# Patient Record
Sex: FEMALE | Race: BLACK OR AFRICAN AMERICAN | NOT HISPANIC OR LATINO | Employment: OTHER | URBAN - NONMETROPOLITAN AREA
[De-identification: names, ages, dates, MRNs, and addresses within clinical notes are randomized per-mention and may not be internally consistent; named-entity substitution may affect disease eponyms.]

---

## 2017-10-30 ENCOUNTER — TRANSCRIBE ORDERS (OUTPATIENT)
Dept: ADMINISTRATIVE | Facility: HOSPITAL | Age: 67
End: 2017-10-30

## 2017-10-30 DIAGNOSIS — Z12.39 SCREENING BREAST EXAMINATION: Primary | ICD-10-CM

## 2017-11-28 ENCOUNTER — HOSPITAL ENCOUNTER (OUTPATIENT)
Dept: MAMMOGRAPHY | Facility: HOSPITAL | Age: 67
Discharge: HOME | End: 2017-11-28
Admitting: FAMILY MEDICINE

## 2017-11-28 DIAGNOSIS — Z12.39 SCREENING BREAST EXAMINATION: ICD-10-CM

## 2017-11-28 PROCEDURE — G0202 SCR MAMMO BI INCL CAD: HCPCS

## 2017-11-28 PROCEDURE — 77063 BREAST TOMOSYNTHESIS BI: CPT

## 2018-10-22 ENCOUNTER — TRANSCRIBE ORDERS (OUTPATIENT)
Dept: MAMMOGRAPHY | Facility: HOSPITAL | Age: 68
End: 2018-10-22

## 2018-10-22 DIAGNOSIS — Z12.39 BREAST CANCER SCREENING: Primary | ICD-10-CM

## 2018-12-06 ENCOUNTER — HOSPITAL ENCOUNTER (OUTPATIENT)
Dept: MAMMOGRAPHY | Facility: HOSPITAL | Age: 68
Discharge: HOME | End: 2018-12-06
Admitting: FAMILY MEDICINE

## 2018-12-06 DIAGNOSIS — Z12.39 BREAST CANCER SCREENING: ICD-10-CM

## 2018-12-06 PROCEDURE — 77067 SCR MAMMO BI INCL CAD: CPT

## 2018-12-06 PROCEDURE — 77063 BREAST TOMOSYNTHESIS BI: CPT

## 2018-12-10 ENCOUNTER — TRANSCRIBE ORDERS (OUTPATIENT)
Dept: ADMINISTRATIVE | Facility: HOSPITAL | Age: 68
End: 2018-12-10

## 2018-12-10 DIAGNOSIS — I70.219 ATHEROSCLEROSIS WITH LIMB CLAUDICATION (HCC): Primary | ICD-10-CM

## 2019-01-03 ENCOUNTER — HOSPITAL ENCOUNTER (OUTPATIENT)
Facility: HOSPITAL | Age: 69
Setting detail: HOSPITAL OUTPATIENT SURGERY
Discharge: HOME | End: 2019-01-03
Attending: INTERNAL MEDICINE | Admitting: INTERNAL MEDICINE

## 2019-01-03 VITALS
HEART RATE: 60 BPM | BODY MASS INDEX: 29.37 KG/M2 | DIASTOLIC BLOOD PRESSURE: 75 MMHG | RESPIRATION RATE: 16 BRPM | OXYGEN SATURATION: 96 % | WEIGHT: 159.61 LBS | SYSTOLIC BLOOD PRESSURE: 153 MMHG | TEMPERATURE: 97.4 F | HEIGHT: 62 IN

## 2019-01-03 DIAGNOSIS — I70.219 ATHEROSCLEROSIS WITH LIMB CLAUDICATION (HCC): ICD-10-CM

## 2019-01-03 LAB
ANION GAP SERPL CALCULATED.3IONS-SCNC: 7 MMOL/L (ref 3–11)
BUN BLD-MCNC: 12 MG/DL (ref 9–23)
BUN/CREAT SERPL: 14.6 (ref 7–25)
CALCIUM SPEC-SCNC: 10.1 MG/DL (ref 8.7–10.4)
CHLORIDE SERPL-SCNC: 103 MMOL/L (ref 99–109)
CO2 SERPL-SCNC: 29 MMOL/L (ref 20–31)
CREAT BLD-MCNC: 0.82 MG/DL (ref 0.6–1.3)
DEPRECATED RDW RBC AUTO: 50.3 FL (ref 37–54)
ERYTHROCYTE [DISTWIDTH] IN BLOOD BY AUTOMATED COUNT: 16.5 % (ref 11.3–14.5)
GFR SERPL CREATININE-BSD FRML MDRD: 85 ML/MIN/1.73
GLUCOSE BLD-MCNC: 107 MG/DL (ref 70–100)
HCT VFR BLD AUTO: 37.5 % (ref 34.5–44)
HGB BLD-MCNC: 12.1 G/DL (ref 11.5–15.5)
MCH RBC QN AUTO: 27.2 PG (ref 27–31)
MCHC RBC AUTO-ENTMCNC: 32.3 G/DL (ref 32–36)
MCV RBC AUTO: 84.3 FL (ref 80–99)
PLATELET # BLD AUTO: 338 10*3/MM3 (ref 150–450)
PMV BLD AUTO: 9.9 FL (ref 6–12)
POTASSIUM BLD-SCNC: 4 MMOL/L (ref 3.5–5.5)
RBC # BLD AUTO: 4.45 10*6/MM3 (ref 3.89–5.14)
SODIUM BLD-SCNC: 139 MMOL/L (ref 132–146)
WBC NRBC COR # BLD: 6.25 10*3/MM3 (ref 3.5–10.8)

## 2019-01-03 PROCEDURE — C1769 GUIDE WIRE: HCPCS | Performed by: INTERNAL MEDICINE

## 2019-01-03 PROCEDURE — 36415 COLL VENOUS BLD VENIPUNCTURE: CPT

## 2019-01-03 PROCEDURE — 25010000002 MIDAZOLAM PER 1 MG: Performed by: INTERNAL MEDICINE

## 2019-01-03 PROCEDURE — 0 IOPAMIDOL PER 1 ML: Performed by: INTERNAL MEDICINE

## 2019-01-03 PROCEDURE — 80048 BASIC METABOLIC PNL TOTAL CA: CPT | Performed by: INTERNAL MEDICINE

## 2019-01-03 PROCEDURE — 85027 COMPLETE CBC AUTOMATED: CPT | Performed by: INTERNAL MEDICINE

## 2019-01-03 PROCEDURE — 75774 ARTERY X-RAY EACH VESSEL: CPT | Performed by: INTERNAL MEDICINE

## 2019-01-03 PROCEDURE — C1894 INTRO/SHEATH, NON-LASER: HCPCS | Performed by: INTERNAL MEDICINE

## 2019-01-03 PROCEDURE — 25010000002 FENTANYL CITRATE (PF) 100 MCG/2ML SOLUTION: Performed by: INTERNAL MEDICINE

## 2019-01-03 PROCEDURE — 75625 CONTRAST EXAM ABDOMINL AORTA: CPT | Performed by: INTERNAL MEDICINE

## 2019-01-03 PROCEDURE — 75716 ARTERY X-RAYS ARMS/LEGS: CPT | Performed by: INTERNAL MEDICINE

## 2019-01-03 RX ORDER — ALPRAZOLAM 0.25 MG/1
0.25 TABLET ORAL 3 TIMES DAILY PRN
Status: DISCONTINUED | OUTPATIENT
Start: 2019-01-03 | End: 2019-01-03 | Stop reason: HOSPADM

## 2019-01-03 RX ORDER — ATORVASTATIN CALCIUM 40 MG/1
40 TABLET, FILM COATED ORAL NIGHTLY
COMMUNITY

## 2019-01-03 RX ORDER — ASPIRIN 325 MG
325 TABLET, DELAYED RELEASE (ENTERIC COATED) ORAL DAILY
Status: DISCONTINUED | OUTPATIENT
Start: 2019-01-03 | End: 2019-01-03 | Stop reason: HOSPADM

## 2019-01-03 RX ORDER — MIDAZOLAM HYDROCHLORIDE 1 MG/ML
INJECTION INTRAMUSCULAR; INTRAVENOUS AS NEEDED
Status: DISCONTINUED | OUTPATIENT
Start: 2019-01-03 | End: 2019-01-03 | Stop reason: HOSPADM

## 2019-01-03 RX ORDER — CLOPIDOGREL BISULFATE 75 MG/1
75 TABLET ORAL DAILY
COMMUNITY
End: 2020-01-07 | Stop reason: HOSPADM

## 2019-01-03 RX ORDER — TEMAZEPAM 7.5 MG/1
7.5 CAPSULE ORAL NIGHTLY PRN
Status: DISCONTINUED | OUTPATIENT
Start: 2019-01-03 | End: 2019-01-03 | Stop reason: HOSPADM

## 2019-01-03 RX ORDER — IRBESARTAN AND HYDROCHLOROTHIAZIDE 300; 12.5 MG/1; MG/1
1 TABLET, FILM COATED ORAL DAILY
COMMUNITY

## 2019-01-03 RX ORDER — ACETAMINOPHEN 325 MG/1
650 TABLET ORAL EVERY 4 HOURS PRN
Status: DISCONTINUED | OUTPATIENT
Start: 2019-01-03 | End: 2019-01-03 | Stop reason: HOSPADM

## 2019-01-03 RX ORDER — FENTANYL CITRATE 50 UG/ML
INJECTION, SOLUTION INTRAMUSCULAR; INTRAVENOUS AS NEEDED
Status: DISCONTINUED | OUTPATIENT
Start: 2019-01-03 | End: 2019-01-03 | Stop reason: HOSPADM

## 2019-01-03 RX ORDER — HYDROCODONE BITARTRATE AND ACETAMINOPHEN 5; 325 MG/1; MG/1
1 TABLET ORAL EVERY 4 HOURS PRN
Status: DISCONTINUED | OUTPATIENT
Start: 2019-01-03 | End: 2019-01-03 | Stop reason: HOSPADM

## 2019-01-03 RX ORDER — ASPIRIN 81 MG/1
81 TABLET ORAL DAILY
COMMUNITY

## 2019-01-03 RX ORDER — DILTIAZEM HYDROCHLORIDE 300 MG/1
300 CAPSULE, COATED, EXTENDED RELEASE ORAL DAILY
COMMUNITY
End: 2019-11-27 | Stop reason: ALTCHOICE

## 2019-01-03 RX ORDER — SODIUM CHLORIDE 9 MG/ML
250 INJECTION, SOLUTION INTRAVENOUS CONTINUOUS
Status: ACTIVE | OUTPATIENT
Start: 2019-01-03 | End: 2019-01-03

## 2019-01-03 RX ORDER — LIDOCAINE HYDROCHLORIDE 10 MG/ML
INJECTION, SOLUTION EPIDURAL; INFILTRATION; INTRACAUDAL; PERINEURAL AS NEEDED
Status: DISCONTINUED | OUTPATIENT
Start: 2019-01-03 | End: 2019-01-03 | Stop reason: HOSPADM

## 2019-01-03 RX ORDER — OMEGA-3S/DHA/EPA/FISH OIL/D3 300MG-1000
400 CAPSULE ORAL 2 TIMES DAILY
COMMUNITY

## 2019-01-03 RX ORDER — OXYCODONE AND ACETAMINOPHEN 10; 325 MG/1; MG/1
1 TABLET ORAL EVERY 4 HOURS PRN
Status: DISCONTINUED | OUTPATIENT
Start: 2019-01-03 | End: 2019-01-03 | Stop reason: HOSPADM

## 2019-01-03 RX ORDER — TRAZODONE HYDROCHLORIDE 50 MG/1
50 TABLET ORAL NIGHTLY PRN
COMMUNITY

## 2019-01-03 RX ORDER — SODIUM CHLORIDE 9 MG/ML
250 INJECTION, SOLUTION INTRAVENOUS ONCE AS NEEDED
Status: DISCONTINUED | OUTPATIENT
Start: 2019-01-03 | End: 2019-01-03 | Stop reason: HOSPADM

## 2019-01-03 RX ADMIN — ASPIRIN 325 MG: 325 TABLET, DELAYED RELEASE ORAL at 12:09

## 2019-01-03 NOTE — H&P
Pre-Catheterization Report  Cardiovascular Laboratory  [facility]      Patient:  Annalise Corona  :  1950  PCP:  Lisa Martinez MD  PHONE:  [phone]    DATE: 1/3/2019    BRIEF HPI:  Annalise Corona is a 68 y.o. female with hypertension, hypercholesterolemia, coronary artery disease, and peripheral arterial disease.  She is post previous left femoropopliteal from 2011.  She is complaining of continued left leg pain which increases with walking and is decreased with rest.  She recently underwent an abnormal lower extremity arterial duplex Doppler.  She now presents for AA with runoff.      Cardiac Risk Factors:  advanced age (older than 55 for men, 65 for women), dyslipidemia, family history of premature cardiovascular disease, hypertension    Anginal class in last 2 weeks:  No anginal symptoms    CHF Class in last 2 weeks:  NYHA Class I    Cardiogenic shock:  no    Cardiac arrest <24 hours:  no    Stress test within last 6 months:   no   Details:    Previous cardiac catheterization:  yes  Details:     Previous CABG:  no  Details:      Allergies:     IV contrast allergy:  no  Allergies not on file    MEDICATIONS:  Prior to Admission medications    Medication Sig Start Date End Date Taking? Authorizing Provider   aspirin 81 MG EC tablet Take 81 mg by mouth Daily.   Yes ProviderAldo MD   atorvastatin (LIPITOR) 40 MG tablet Take 40 mg by mouth Daily.   Yes ProviderAldo MD   cholecalciferol (VITAMIN D3) 400 units tablet Take 400 Units by mouth 2 (Two) Times a Day.   Yes Aldo Brandon MD   clopidogrel (PLAVIX) 75 MG tablet Take 75 mg by mouth Daily.   Yes ProviderAldo MD   diltiaZEM CD (CARDIZEM CD) 300 MG 24 hr capsule Take 300 mg by mouth Daily.   Yes Aldo Brandon MD   irbesartan-hydrochlorothiazide (AVALIDE) 300-12.5 MG tablet Take 1 tablet by mouth Daily.   Yes Aldo Brandon MD   Multiple Vitamin (MULTIVITAMINS PO) Take  by mouth  Daily.   Yes Provider, MD Aldo   traZODone (DESYREL) 50 MG tablet Take 50 mg by mouth Every Night.   Yes Provider, MD Aldo       Past medical & surgical history, social and family history reviewed in the electronic medical record.    ROS:  Cardiovascular ROS: no chest pain or dyspnea on exertion    Physical Exam:    Vitals: There were no vitals filed for this visit. There were no vitals filed for this visit.    General Appearance:    Alert, cooperative, in no acute distress   Head:    Normocephalic, without obvious abnormality, atraumatic   Eyes:            Lids and lashes normal, conjunctivae and sclerae normal, no   icterus, no pallor, corneas clear, PERRLA   Ears:    Ears appear intact with no abnormalities noted   Throat:      Neck:   No adenopathy, supple, trachea midline, no thyromegaly, no     carotid bruit, no JVD   Back:     No kyphosis present, no scoliosis present, range of motion normal   Lungs:     Clear to auscultation,respirations regular, even and                   unlabored    Heart:    Regular rhythm and normal rate, normal S1 and S2, no            murmur, no gallop, no rub, no click   Breast Exam:    Deferred   Abdomen:     Normal bowel sounds, no masses, no organomegaly, soft        non-tender, non-distended, no guarding, no rebound                 tenderness   Genitalia:    Deferred   Extremities:   Moves all extremities well, no edema, no cyanosis, no              redness   Pulses:   Pulses not palpable    Skin:   No bleeding, bruising or rash   Lymph nodes:      Neurologic:   Cranial nerves 2 - 12 grossly intact     Barbaeu Test:  Left: Not Assessed  (oxymetric Allens) Right: Not Assessed             No results found for: CHLPL, TRIG, HDL, LDLDIRECT, AST, ALT        IMPRESSION:  · Abnormal lower extremity arterial duplex    PLAN:  · Procedure to perform: AA with runoff  · Planned access:  Per M.D.

## 2019-05-07 RX ORDER — SODIUM, POTASSIUM,MAG SULFATES 17.5-3.13G
SOLUTION, RECONSTITUTED, ORAL ORAL
Qty: 2 BOTTLE | Refills: 0 | Status: SHIPPED | OUTPATIENT
Start: 2019-05-07 | End: 2019-09-16

## 2019-07-29 ENCOUNTER — TRANSCRIBE ORDERS (OUTPATIENT)
Dept: ADMINISTRATIVE | Facility: HOSPITAL | Age: 69
End: 2019-07-29

## 2019-07-29 DIAGNOSIS — Z78.0 POSTMENOPAUSAL STATE: Primary | ICD-10-CM

## 2019-07-30 ENCOUNTER — APPOINTMENT (OUTPATIENT)
Dept: BONE DENSITY | Facility: HOSPITAL | Age: 69
End: 2019-07-30

## 2019-07-30 DIAGNOSIS — Z78.0 POSTMENOPAUSAL STATE: ICD-10-CM

## 2019-07-30 PROCEDURE — 77080 DXA BONE DENSITY AXIAL: CPT

## 2019-09-16 ENCOUNTER — OFFICE VISIT (OUTPATIENT)
Dept: GASTROENTEROLOGY | Facility: CLINIC | Age: 69
End: 2019-09-16

## 2019-09-16 DIAGNOSIS — K63.5 POLYP OF COLON, UNSPECIFIED PART OF COLON, UNSPECIFIED TYPE: ICD-10-CM

## 2019-09-16 DIAGNOSIS — Z12.11 COLON CANCER SCREENING: Primary | ICD-10-CM

## 2019-09-16 DIAGNOSIS — D64.9 ANEMIA, UNSPECIFIED TYPE: ICD-10-CM

## 2019-09-16 PROCEDURE — S0260 H&P FOR SURGERY: HCPCS | Performed by: INTERNAL MEDICINE

## 2019-09-16 RX ORDER — PHENOL 1.4 %
600 AEROSOL, SPRAY (ML) MUCOUS MEMBRANE 2 TIMES DAILY WITH MEALS
COMMUNITY

## 2019-09-16 NOTE — PATIENT INSTRUCTIONS
1. Low-fat-high-fiber diet.  2. Colonoscopy: Description of the procedure, risks benefits alternatives and options including non-operative options were discussed with the patient in detail.  The patient understands and wishes to proceed.  3. Records from cardiology service-Dr. Osman.

## 2019-09-16 NOTE — PROGRESS NOTES
Chief Complaint   Patient presents with   • Colon Cancer Screening     History of Present Illness     For colon cancer screening.  The patient denies recent change in bowel habits.  There is no diarrhea or constipation.  There is no history of abdominal pain.  There is no history of overt GI bleed (hematemesis melena or hematochezia).  The patient denies nausea or vomiting.  There is no history of reflux.  The patient denies dysphagia or odynophagia.  There is no history of recent significant weight loss.  There is no history of liver or pancreatic disease.    The patient had undergone a colonoscopy about 5 years ago in [facility].  There is history of colon polyps.  Unfortunately, the details are not known.  The patient was recommended to have a follow-up colonoscopy in 5 years.  Recently, in May 2019 the patient was noted to be mildly anemic with hemoglobin of 11.6.  There is no history of gross hematuria, nosebleeds or vaginal bleeds.  There is no associated weakness, dizziness, chest pains or excessive shortness of breath.    The patient has history of peripheral vascular disease as well as coronary artery disease for which she had stents placed in the past.  Currently the patient is on aspirin and Plavix.     Review of Systems   Constitutional: Negative for appetite change, chills, fatigue, fever and unexpected weight change.   HENT: Negative for mouth sores, nosebleeds and trouble swallowing.    Eyes: Negative for discharge and redness.   Respiratory: Negative for apnea, cough and shortness of breath.    Cardiovascular: Negative for chest pain, palpitations and leg swelling.   Gastrointestinal: Negative for abdominal distention, abdominal pain, anal bleeding, blood in stool, constipation, diarrhea, nausea and vomiting.   Endocrine: Negative for cold intolerance, heat intolerance and polydipsia.   Genitourinary: Negative for dysuria, hematuria and urgency.   Musculoskeletal: Negative for arthralgias,  joint swelling and myalgias.   Skin: Negative for rash.   Allergic/Immunologic: Negative for food allergies and immunocompromised state.   Neurological: Negative for dizziness, seizures, syncope and headaches.   Hematological: Negative for adenopathy. Does not bruise/bleed easily.   Psychiatric/Behavioral: Positive for sleep disturbance. Negative for dysphoric mood. The patient is not nervous/anxious and is not hyperactive.      Patient Active Problem List   Diagnosis   • Atherosclerosis with limb claudication (CMS/HCC)     Past Medical History:   Diagnosis Date   • Arthritis    • Borderline diabetes    • Hyperlipidemia    • Hypertension    • PVD (peripheral vascular disease) (CMS/HCC)     12 years ago    • Wears glasses      Past Surgical History:   Procedure Laterality Date   • ANGIOPLASTY      stents after  per patient    • BREAST BIOPSY Left        • HYSTERECTOMY     • INTERVENTIONAL RADIOLOGY PROCEDURE N/A 1/3/2019    Procedure: Abdominal Aortagram with Runoff;  Surgeon: Charan Osman MD;  Location: [facility] INVASIVE LOCATION;  Service: Cardiovascular     Family History   Problem Relation Age of Onset   • Colon cancer Father    • Lung cancer Father      Social History     Tobacco Use   • Smoking status: Former Smoker     Last attempt to quit: 1/3/2013     Years since quittin.7   • Smokeless tobacco: Never Used   Substance Use Topics   • Alcohol use: Yes     Comment: occassional       Current Outpatient Medications:   •  aspirin 81 MG EC tablet, Take 81 mg by mouth Daily., Disp: , Rfl:   •  atorvastatin (LIPITOR) 40 MG tablet, Take 40 mg by mouth Daily., Disp: , Rfl:   •  calcium carbonate (CALCIUM 600) 600 MG tablet, Take 600 mg by mouth Daily., Disp: , Rfl:   •  cholecalciferol (VITAMIN D3) 400 units tablet, Take 400 Units by mouth 2 (Two) Times a Day., Disp: , Rfl:   •  clopidogrel (PLAVIX) 75 MG tablet, Take 75 mg by mouth Daily., Disp: , Rfl:   •  diltiaZEM CD (CARDIZEM CD) 300 MG 24  hr capsule, Take 300 mg by mouth Daily., Disp: , Rfl:   •  irbesartan-hydrochlorothiazide (AVALIDE) 300-12.5 MG tablet, Take 1 tablet by mouth Daily., Disp: , Rfl:   •  Multiple Vitamin (MULTIVITAMINS PO), Take  by mouth Daily., Disp: , Rfl:   •  Omega-3 Fatty Acids (OMEGA 3 500 PO), Take  by mouth. 520 mg daily, Disp: , Rfl:   •  traZODone (DESYREL) 50 MG tablet, Take 50 mg by mouth Every Night., Disp: , Rfl:     No Known Allergies    There were no vitals taken for this visit.    Physical Exam   Constitutional: She is oriented to person, place, and time. She appears well-developed and well-nourished. No distress.   HENT:   Head: Normocephalic and atraumatic.   Right Ear: Hearing and external ear normal.   Left Ear: Hearing and external ear normal.   Nose: Nose normal.   Mouth/Throat: Oropharynx is clear and moist and mucous membranes are normal. Mucous membranes are not pale, not dry and not cyanotic. No oral lesions. No oropharyngeal exudate.   Eyes: Conjunctivae and EOM are normal. Right eye exhibits no discharge. Left eye exhibits no discharge. No scleral icterus.   Neck: Trachea normal. Neck supple. No JVD present. No edema present. No thyroid mass and no thyromegaly present.   Cardiovascular: Normal rate, regular rhythm, S2 normal and normal heart sounds. Exam reveals no gallop, no S3 and no friction rub.   No murmur heard.  Pulmonary/Chest: Effort normal and breath sounds normal. No respiratory distress. She has no wheezes. She has no rales. She exhibits no tenderness.   Abdominal: Soft. Normal appearance and bowel sounds are normal. She exhibits no distension, no ascites and no mass. There is no splenomegaly or hepatomegaly. There is no tenderness. There is no rigidity, no rebound and no guarding. No hernia.   Musculoskeletal: She exhibits no tenderness or deformity.     Vascular Status -  Her right foot exhibits no edema. Her left foot exhibits no edema.  Lymphadenopathy:     She has no cervical adenopathy.         Left: No supraclavicular adenopathy present.   Neurological: She is alert and oriented to person, place, and time. She has normal strength. No cranial nerve deficit or sensory deficit. She exhibits normal muscle tone. Coordination normal.   Skin: No rash noted. She is not diaphoretic. No cyanosis. No pallor. Nails show no clubbing.   Psychiatric: She has a normal mood and affect. Her behavior is normal. Judgment and thought content normal.   Nursing note and vitals reviewed.  Stigmata of chronic liver disease:  None.  Asterixis:  None.    Laboratory Testing:  Upon review of medical records:    Dated January 3, 2019 sodium 139 potassium 4.0 chloride 103 CO2 29 BUN 12 serum creatinine 0.82 glucose 107.  Calcium 10.1.  WBC 6.25 hemoglobin 12.1 hematocrit 37.5 MCV 84.3 and platelet count 338.    Dated April 16, 2019 sodium 144 potassium 4.2 chloride 100 CO2 25 BUN 21 serum creatinine 0.94 glucose 88.  Calcium 10.3.  Total protein 7.1.  Albumin 4.8.  T bili 0.2 AST 20 ALT 13 alkaline phosphatase 60.  Total cholesterol 215.  Triglycerides 48.  WBC 5.2 hemoglobin 11.8 hemoglobin 30.6 0.1 MCV 85 platelet count 346.    Procedures:   Upon review of records:     Dated April 21, 2014 the patient underwent an upper endoscopy which revealed: Report unavailable.  Gastric biopsies revealed reactive gastropathy with focal surface erosion; special stain negative for Helicobacter.  Duodenal biopsies revealed no significant pathologic abnormalities.    Assessment:      ICD-10-CM ICD-9-CM   1. Colon cancer screening Z12.11 V76.51   2. Anemia, unspecified type D64.9 285.9   3. Polyp of colon, unspecified part of colon, unspecified type K63.5 211.3         Discussion:  1.     Plan/  Patient Instructions   1. Low-fat-high-fiber diet.  2. Colonoscopy: Description of the procedure, risks benefits alternatives and options including non-operative options were discussed with the patient in detail.  The patient understands and wishes to  proceed.  3. Records from cardiology service-Dr. Osman.       Kirit Gilmore MD

## 2019-10-02 ENCOUNTER — PREP FOR SURGERY (OUTPATIENT)
Dept: OTHER | Facility: HOSPITAL | Age: 69
End: 2019-10-02

## 2019-10-02 DIAGNOSIS — Z12.11 COLON CANCER SCREENING: Primary | ICD-10-CM

## 2019-10-02 DIAGNOSIS — D64.9 ANEMIA: ICD-10-CM

## 2019-10-02 DIAGNOSIS — Z86.010 HISTORY OF COLON POLYPS: ICD-10-CM

## 2019-10-02 RX ORDER — SODIUM CHLORIDE 9 MG/ML
70 INJECTION, SOLUTION INTRAVENOUS CONTINUOUS PRN
Status: CANCELLED | OUTPATIENT
Start: 2019-10-16

## 2019-10-04 PROBLEM — Z86.0100 HISTORY OF COLON POLYPS: Status: ACTIVE | Noted: 2019-10-04

## 2019-10-04 PROBLEM — D64.9 ANEMIA: Status: ACTIVE | Noted: 2019-10-04

## 2019-10-04 PROBLEM — Z86.010 HISTORY OF COLON POLYPS: Status: ACTIVE | Noted: 2019-10-04

## 2019-10-04 PROBLEM — Z12.11 COLON CANCER SCREENING: Status: ACTIVE | Noted: 2019-10-04

## 2019-10-16 ENCOUNTER — ANESTHESIA (OUTPATIENT)
Dept: GASTROENTEROLOGY | Facility: HOSPITAL | Age: 69
End: 2019-10-16

## 2019-10-16 ENCOUNTER — ANESTHESIA EVENT (OUTPATIENT)
Dept: GASTROENTEROLOGY | Facility: HOSPITAL | Age: 69
End: 2019-10-16

## 2019-10-16 ENCOUNTER — HOSPITAL ENCOUNTER (OUTPATIENT)
Facility: HOSPITAL | Age: 69
Setting detail: HOSPITAL OUTPATIENT SURGERY
Discharge: HOME | End: 2019-10-16
Attending: INTERNAL MEDICINE | Admitting: INTERNAL MEDICINE

## 2019-10-16 VITALS
RESPIRATION RATE: 16 BRPM | BODY MASS INDEX: 28.89 KG/M2 | TEMPERATURE: 98.3 F | SYSTOLIC BLOOD PRESSURE: 104 MMHG | HEART RATE: 52 BPM | DIASTOLIC BLOOD PRESSURE: 56 MMHG | WEIGHT: 157 LBS | OXYGEN SATURATION: 96 % | HEIGHT: 62 IN

## 2019-10-16 DIAGNOSIS — Z12.11 COLON CANCER SCREENING: ICD-10-CM

## 2019-10-16 DIAGNOSIS — Z86.010 HISTORY OF COLON POLYPS: ICD-10-CM

## 2019-10-16 DIAGNOSIS — D64.9 ANEMIA: ICD-10-CM

## 2019-10-16 PROCEDURE — 45380 COLONOSCOPY AND BIOPSY: CPT | Performed by: INTERNAL MEDICINE

## 2019-10-16 PROCEDURE — 45388 COLONOSCOPY W/ABLATION: CPT | Performed by: INTERNAL MEDICINE

## 2019-10-16 PROCEDURE — 25010000002 MIDAZOLAM PER 1 MG: Performed by: NURSE ANESTHETIST, CERTIFIED REGISTERED

## 2019-10-16 PROCEDURE — 25010000002 PROPOFOL 10 MG/ML EMULSION: Performed by: NURSE ANESTHETIST, CERTIFIED REGISTERED

## 2019-10-16 RX ORDER — LIDOCAINE 50 MG/G
OINTMENT TOPICAL AS NEEDED
Status: DISCONTINUED | OUTPATIENT
Start: 2019-10-16 | End: 2019-10-16 | Stop reason: HOSPADM

## 2019-10-16 RX ORDER — PROPOFOL 10 MG/ML
VIAL (ML) INTRAVENOUS AS NEEDED
Status: DISCONTINUED | OUTPATIENT
Start: 2019-10-16 | End: 2019-10-16 | Stop reason: SURG

## 2019-10-16 RX ORDER — SODIUM CHLORIDE 9 MG/ML
70 INJECTION, SOLUTION INTRAVENOUS CONTINUOUS PRN
Status: DISCONTINUED | OUTPATIENT
Start: 2019-10-16 | End: 2019-10-16 | Stop reason: HOSPADM

## 2019-10-16 RX ORDER — MIDAZOLAM HYDROCHLORIDE 5 MG/ML
INJECTION INTRAMUSCULAR; INTRAVENOUS AS NEEDED
Status: DISCONTINUED | OUTPATIENT
Start: 2019-10-16 | End: 2019-10-16 | Stop reason: SURG

## 2019-10-16 RX ORDER — MEPERIDINE HYDROCHLORIDE 50 MG/ML
INJECTION INTRAMUSCULAR; INTRAVENOUS; SUBCUTANEOUS AS NEEDED
Status: DISCONTINUED | OUTPATIENT
Start: 2019-10-16 | End: 2019-10-16 | Stop reason: SURG

## 2019-10-16 RX ORDER — KETAMINE HCL IN NACL, ISO-OSM 100MG/10ML
SYRINGE (ML) INJECTION AS NEEDED
Status: DISCONTINUED | OUTPATIENT
Start: 2019-10-16 | End: 2019-10-16 | Stop reason: SURG

## 2019-10-16 RX ORDER — SIMETHICONE 20 MG/.3ML
EMULSION ORAL AS NEEDED
Status: DISCONTINUED | OUTPATIENT
Start: 2019-10-16 | End: 2019-10-16 | Stop reason: HOSPADM

## 2019-10-16 RX ADMIN — Medication 25 MG: at 09:33

## 2019-10-16 RX ADMIN — PROPOFOL 10 MG: 10 INJECTION, EMULSION INTRAVENOUS at 09:43

## 2019-10-16 RX ADMIN — PROPOFOL 10 MG: 10 INJECTION, EMULSION INTRAVENOUS at 09:58

## 2019-10-16 RX ADMIN — PROPOFOL 10 MG: 10 INJECTION, EMULSION INTRAVENOUS at 09:39

## 2019-10-16 RX ADMIN — PROPOFOL 10 MG: 10 INJECTION, EMULSION INTRAVENOUS at 09:47

## 2019-10-16 RX ADMIN — SODIUM CHLORIDE 70 ML/HR: 9 INJECTION, SOLUTION INTRAVENOUS at 07:41

## 2019-10-16 RX ADMIN — MIDAZOLAM HYDROCHLORIDE 2 MG: 5 INJECTION, SOLUTION INTRAMUSCULAR; INTRAVENOUS at 09:33

## 2019-10-16 RX ADMIN — PROPOFOL 20 MG: 10 INJECTION, EMULSION INTRAVENOUS at 09:33

## 2019-10-16 RX ADMIN — PROPOFOL 10 MG: 10 INJECTION, EMULSION INTRAVENOUS at 09:53

## 2019-10-16 RX ADMIN — MEPERIDINE HYDROCHLORIDE 50 MG: 50 INJECTION, SOLUTION INTRAMUSCULAR; INTRAVENOUS; SUBCUTANEOUS at 09:33

## 2019-10-16 RX ADMIN — PROPOFOL 10 MG: 10 INJECTION, EMULSION INTRAVENOUS at 09:36

## 2019-10-16 NOTE — OP NOTE
PROCEDURE:  Colonoscopy to the terminal ileum with thermal ablation therapy of colonic vascular ectasia using argon plasma coagulator and 2 cold biopsy polypectomies as well as biopsies.    DATE OF PROCEDURE:  October 16, 2019    REFERRING PROVIDER:  Lisa Martinez MD     INSTRUMENT USED:  Olympus PCF H 190 videocolonoscope.      INDICATIONS OF THE PROCEDURE:   This is a 68-year-old -American female for colon cancer screening.  Patient has history of diarrhea and anemia.    PREVIOUS COLONOSCOPY: 2012.    BIOPSIES: Biopsies were obtained from the terminal ileum and randomly from the colon including rectum.  2 cold biopsy polypectomies were performed one in the cecum and one in the rectum.      PHOTOGRAPHS:  Photographs were included in the medical records.     MEDICATIONS:  MAC.       CONSENT/PREPROCEDURE EVALUATION:  Risks, benefits, alternatives and options of the procedure including risks of sedation/anesthesia were discussed with the patient and informed consent was obtained prior to the procedure.  History and physical examination were performed and nothing precluded the test.      REPORT:  The patient was placed in left lateral decubitus position and a digital examination was performed.  Once under the influence of IV sedation, the instrument was inserted into the rectum and advanced under direct vision to cecum which was identified by the ileocecal valve, triradiate folds and appendiceal orifice. The scope was then maneuvered into the terminal ileum.        FINDINGS:      Digital rectal examination:  Good anal tone.  No perianal pathology.  No mass.        Terminal ileum:  7-8 cm.  Normal.  Biopsies were obtained.     Cecum and ascending colon: A 3 mm sessile polyp in the cecum was removed with cold biopsy forceps.  Angiodysplasia was seen in the right colon.  One site had a tendency to minimally ooze blood.  These areas were treated with thermal ablation therapy using argon plasma coagulator (Right  colon APC mode/ERBE/Side firing probe).  Scant diverticulosis was noted in the right colon.    Hepatic flexure, transverse colon, splenic flexure: Scant diverticulosis.     Descending colon, sigmoid colon and rectum: Scant diverticulosis.  3 mm sessile polyp in the rectum was removed with cold biopsy forceps.  No endoscopic evidence of colitis was seen.  Random biopsies were obtained from the colon upon withdrawal of the scope.  A retroflex examination within the rectum was limited due to inability to hold air.  Internal hemorrhoids were noted in the distal rectum.  These areas were also reviewed and forward-viewing.  No additional pathology was seen.     The scope was then straightened, the lower GI tract was decompressed, and the scope was pulled out of the patient.  The patient tolerated the procedure well.  There were no immediate complications and the patient was transferred in stable condition for post procedure observation.      TECHNICAL DATA:   1. [place] prep score: 9 (3+3+3).    2. Anus to cecal time: 4  minutes.  3. Difficulty of examination:  Average.    4. Withdrawal time: 17 minutes.  5. Procedure time: 25 minutes  6. Retroflex examination in right colon: Yes.    7. Second look Rectum to cecum with decompression: Yes.    DIAGNOSES:    1. Scant-pandiverticulosis.    2. Internal hemorrhoids.  3. Colon polyps.  2 were removed.  3 mm in size.  4. Angiodysplasia in the right colon.  One with a minimal ooze of blood.  Status post thermal ablation therapy using argon plasma coagulator.    RECOMMENDATIONS:     1. Dietary instructions.  2. Follow biopsies.    3. Follow-up:   3 to 4 weeks.  4. Followup colonoscopy:  5 years.          Thank you very much for letting me participate in the care of this patient. Please do not hesitate to call me if you have any questions.

## 2019-10-16 NOTE — DISCHARGE INSTRUCTIONS
Rest today  No pushing, pulling, tugging, heavy lifting, or strenuous activity   No major decision making, driving, or drinking alcoholic beverages for 24 hours due to the sedation you received  Always use good hand hygiene/washing technique  No driving on pain medication.      To assist you in voiding:  Drink plenty of fluids  Listen to running water while attempting to void.    If you are unable to urinate and you have an uncomfortable urge to void or it has been   6 hours since you were discharged, return to the Emergency Room.    ***********************************************************************************    Postprocedure instructions:  1. Nothing by mouth to fully alert.  2. Once fully alert may have clear liquid diet.  3. Advance diet as tolerated.  4. Vital signs as routine.    Diet:   1. Low-fat low lactose diet.  2. Janene's All Bran-Bran Buds 1/4 cup daily.  3. May add Honey Bunches of Oats with Almonds 1/4 cup for taste.  4. Use almond milk, soymilk or Fair life milk.  Avoid other milk products and ice cream.    5. Drink liberal amounts of water.    Blood Thinner Directions:  Avoid Plavix (clopidogrel) for 7 days.  If no bleeding may resume Plavix (clopidogrel) on: Tuesday, October 22, 2019.    Treatments:    Other Instructions:  Call [facility] at [phone] or come to the Emergency Department if you experience the following: Chest pain, abdominal pain, bleeding (vomiting of blood or coffee colored material, black stools or jarvis blood in stools), fever/chills, nausea and vomiting or dizziness.    Follow-up:  DR. HUGO GILMORE in 3-4 weeks.Office phone # [phone].    Follow-up colonoscopy: 5 years.      ************************************************************************************    Notes to the patient and the family from Dr. Gilmore.    Dear patient/family member,    Today your colon was examined extensively from rectum to cecum and beyond into the small intestine twice.    Findings on today's procedure are as follows:    1. Scant (pan) diverticulosis.  These are benign outpouchings in the colon that are often seen.  2. 2 polyps were found and removed.   No cancer. No inflammation or colitis. No suggestion of Crohn's disease.  3. Internal hemorrhoids.    Recommendations:  1. As above.    Should you have more questions please do not hesitate to talk to the nurse who can call me and let me talk to you.      I hope you feel better.    Kirit iGlmore M.D., FACP, FACG.

## 2019-10-16 NOTE — ANESTHESIA POSTPROCEDURE EVALUATION
Patient: Annalise Corona    Procedure Summary     Date:  10/16/19 Room / Location:  [facility] ENDOSCOPY 2 / [facility] ENDOSCOPY    Anesthesia Start:  0923 Anesthesia Stop:      Procedure:  COLONOSCOPY (N/A Anus) Diagnosis:       Colon cancer screening      History of colon polyps      Anemia      (Colon cancer screening [Z12.11])      (History of colon polyps [Z86.010])      (Anemia [D64.9])    Surgeon:  Kirit Gilmore MD Provider:  Charan Diaz CRNA    Anesthesia Type:  MAC ASA Status:  3          Anesthesia Type: MAC  Last vitals  BP   92/67 (10/16/19 0726)   Temp   97.2 °F (36.2 °C) (10/16/19 0726)   Pulse   67 (10/16/19 0726)   Resp   18 (10/16/19 0726)     SpO2   98 % (10/16/19 0726)     Post Anesthesia Care and Evaluation    Patient location during evaluation: PHASE II  Patient participation: complete - patient participated  Level of consciousness: awake  Pain score: 0  Pain management: adequate  Airway patency: patent  Anesthetic complications: No anesthetic complications  PONV Status: none  Cardiovascular status: acceptable  Respiratory status: acceptable and nasal cannula  Hydration status: acceptable    Comments: vsss resp spont, reflexes intact, responsive, report given to pacu nurse

## 2019-10-16 NOTE — ANESTHESIA PREPROCEDURE EVALUATION
Anesthesia Evaluation     Patient summary reviewed and Nursing notes reviewed   no history of anesthetic complications:  NPO Solid Status: > 8 hours  NPO Liquid Status: > 8 hours           Airway   Mallampati: II  TM distance: >3 FB  Neck ROM: full  Dental      Pulmonary    (+) decreased breath sounds,   (-) not a smoker  Cardiovascular     PT is on anticoagulation therapy    (+) hypertension, PVD, hyperlipidemia,       Neuro/Psych  (+) CVA,     GI/Hepatic/Renal/Endo      Musculoskeletal     Abdominal    Substance History      OB/GYN          Other   (+) arthritis                     Anesthesia Plan    ASA 3     MAC   (Risks and benefits discussed including risk of aspiration, recall and dental damage. All patient questions answered.    Will continue with plan of care.)  intravenous induction   Anesthetic plan, all risks, benefits, and alternatives have been provided, discussed and informed consent has been obtained with: patient.    Plan discussed with CRNA.

## 2019-10-16 NOTE — INTERVAL H&P NOTE
The patient has history of diarrhea off-and-on for the last 2 years. The diarrhea is episodic. Severity is moderate, frequency of bowel movements being 3-5 times a day during the episodes. The episodes may occur once a week to once a month and may last for a day or so..  The stools are described as loose and at times watery.  Occasionally, there is a nocturnal element of diarrhea. The diarrhea is associated with tenesmus at times. Generally the diarrhea is associated with use of dairy products. There is no history of recent weight loss. Of interest the patient has a daughter with celiac disease. There is no family history of colon cancer.her diarrhea has improved. Currently the patient has been having one formed stool a day.     There is no history of abdominal pain.  There is no history of overt GI bleed (hematemesis melena or hematochezia).  The patient denies nausea or vomiting.  There is no history of reflux.  The patient denies dysphagia or odynophagia.  There is no history of recent significant weight loss.  There is no history of liver or pancreatic disease. There is no history of anemia.

## 2019-10-19 LAB
LAB AP CASE REPORT: NORMAL
PATH REPORT.FINAL DX SPEC: NORMAL

## 2019-10-25 ENCOUNTER — TRANSCRIBE ORDERS (OUTPATIENT)
Dept: MAMMOGRAPHY | Facility: HOSPITAL | Age: 69
End: 2019-10-25

## 2019-10-25 DIAGNOSIS — Z12.39 BREAST CANCER SCREENING: Primary | ICD-10-CM

## 2019-11-27 ENCOUNTER — OFFICE VISIT (OUTPATIENT)
Dept: GASTROENTEROLOGY | Facility: CLINIC | Age: 69
End: 2019-11-27

## 2019-11-27 VITALS
BODY MASS INDEX: 28.89 KG/M2 | SYSTOLIC BLOOD PRESSURE: 120 MMHG | DIASTOLIC BLOOD PRESSURE: 65 MMHG | HEIGHT: 62 IN | RESPIRATION RATE: 16 BRPM | HEART RATE: 77 BPM | TEMPERATURE: 97.8 F | WEIGHT: 157 LBS

## 2019-11-27 DIAGNOSIS — K57.30 DIVERTICULOSIS OF COLON: Chronic | ICD-10-CM

## 2019-11-27 DIAGNOSIS — D64.9 ANEMIA, UNSPECIFIED TYPE: Primary | Chronic | ICD-10-CM

## 2019-11-27 DIAGNOSIS — K63.5 POLYP OF COLON, UNSPECIFIED PART OF COLON, UNSPECIFIED TYPE: Chronic | ICD-10-CM

## 2019-11-27 DIAGNOSIS — K64.8 INTERNAL HEMORRHOIDS: Chronic | ICD-10-CM

## 2019-11-27 DIAGNOSIS — K55.20 ANGIODYSPLASIA OF COLON: Chronic | ICD-10-CM

## 2019-11-27 PROBLEM — Z12.11 COLON CANCER SCREENING: Status: RESOLVED | Noted: 2019-10-04 | Resolved: 2019-11-27

## 2019-11-27 PROBLEM — I73.9 PERIPHERAL VASCULAR DISEASE: Status: ACTIVE | Noted: 2019-04-17

## 2019-11-27 PROCEDURE — 99213 OFFICE O/P EST LOW 20 MIN: CPT | Performed by: NURSE PRACTITIONER

## 2019-11-27 RX ORDER — SODIUM CHLORIDE 9 MG/ML
70 INJECTION, SOLUTION INTRAVENOUS CONTINUOUS PRN
Status: CANCELLED | OUTPATIENT
Start: 2019-11-27

## 2019-11-27 RX ORDER — AMLODIPINE BESYLATE 10 MG/1
10 TABLET ORAL
COMMUNITY

## 2019-11-27 NOTE — PATIENT INSTRUCTIONS
1. High fiber diet with liberal water intake.   2. Follow up colonoscopy in 5 years.   3. Upper endoscopy-EGD: Description of the procedure, risks, benefits, alternatives and options, including nonoperative options, were discussed with the patient in detail. The patient understands and wishes to proceed.

## 2019-11-27 NOTE — PROGRESS NOTES
Chief Complaint   Patient presents with   • Follow-up     colon     There is a history of mild anemia. The patient denies overt GI bleed, no hematemesis, hematochezia or melena. There is no history of nosebleeds or vaginal bleeding. The patient denies abdominal pain, nausea or vomiting. There is no history of heartburn or reflux. The patient denies constipation or diarrhea.     Anemia   Presents for follow-up visit. There has been no abdominal pain, bruising/bleeding easily, fever, palpitations or weight loss. Signs of blood loss that are not present include hematemesis, hematochezia, melena and vaginal bleeding.      Review of Systems   Constitutional: Negative for appetite change, chills, fatigue, fever, unexpected weight change and weight loss.   HENT: Negative for mouth sores, nosebleeds and trouble swallowing.    Eyes: Negative for discharge and redness.   Respiratory: Negative for apnea, cough and shortness of breath.    Cardiovascular: Negative for chest pain, palpitations and leg swelling.   Gastrointestinal: Negative for abdominal distention, abdominal pain, anal bleeding, blood in stool, constipation, diarrhea, hematemesis, hematochezia, melena, nausea and vomiting.   Endocrine: Negative for cold intolerance, heat intolerance and polydipsia.   Genitourinary: Negative for dysuria, hematuria, urgency and vaginal bleeding.   Musculoskeletal: Negative for arthralgias, joint swelling and myalgias.   Skin: Negative for rash.   Allergic/Immunologic: Negative for food allergies and immunocompromised state.   Neurological: Negative for dizziness, seizures, syncope and headaches.   Hematological: Negative for adenopathy. Does not bruise/bleed easily.   Psychiatric/Behavioral: Negative for dysphoric mood. The patient is not nervous/anxious and is not hyperactive.      Patient Active Problem List   Diagnosis   • Atherosclerosis with limb claudication (CMS/HCC)   • History of colon polyps   • Anemia   • Diabetes  mellitus (CMS/HCC)   • Essential hypertension   • Hyperlipidemia   • Peripheral vascular disease (CMS/HCC)   • Coronary arteriosclerosis   • Angiodysplasia of colon   • Polyp of colon   • Diverticulosis of colon   • Internal hemorrhoids     Past Medical History:   Diagnosis Date   • Arthritis    • Borderline diabetes    • Colon polyp 10/16/2019   • Hyperlipidemia    • Hypertension    • PVD (peripheral vascular disease) (CMS/HCC)     12 years ago    • Stroke (CMS/HCC)    • Wears glasses      Past Surgical History:   Procedure Laterality Date   • ANGIOPLASTY      stents after  per patient    • BREAST BIOPSY Left        • CARDIOVASCULAR STRESS TEST     • COLONOSCOPY N/A 10/16/2019    Procedure: COLONOSCOPY WITH ARGON THERMAL ABLATION, COLD BIOPSY POLYPECTOMIES, AND BIOPSIES;  Surgeon: Kirit Gilmore MD;  Location: [facility] ENDOSCOPY;  Service: Gastroenterology   • HYSTERECTOMY     • INTERVENTIONAL RADIOLOGY PROCEDURE N/A 1/3/2019    Procedure: Abdominal Aortagram with Runoff;  Surgeon: Charan Osman MD;  Location: [facility] CATH INVASIVE LOCATION;  Service: Cardiovascular   • UPPER GASTROINTESTINAL ENDOSCOPY  over 10 years ago     Family History   Problem Relation Age of Onset   • Colon cancer Father    • Lung cancer Father      Social History     Tobacco Use   • Smoking status: Former Smoker     Last attempt to quit: 1/3/2013     Years since quittin.9   • Smokeless tobacco: Never Used   Substance Use Topics   • Alcohol use: Yes     Comment: occassional       Current Outpatient Medications:   •  amLODIPine (NORVASC) 10 MG tablet, Take 10 mg by mouth Daily., Disp: , Rfl:   •  aspirin 81 MG EC tablet, Take 81 mg by mouth Daily., Disp: , Rfl:   •  atorvastatin (LIPITOR) 40 MG tablet, Take 40 mg by mouth Daily., Disp: , Rfl:   •  calcium carbonate (CALCIUM 600) 600 MG tablet, Take 600 mg by mouth Daily., Disp: , Rfl:   •  cholecalciferol (VITAMIN D3) 400 units tablet, Take 400 Units by mouth 2 (Two) Times a  "Day., Disp: , Rfl:   •  clopidogrel (PLAVIX) 75 MG tablet, Take 75 mg by mouth Daily., Disp: , Rfl:   •  irbesartan-hydrochlorothiazide (AVALIDE) 300-12.5 MG tablet, Take 1 tablet by mouth Daily., Disp: , Rfl:   •  Multiple Vitamin (MULTIVITAMINS PO), Take  by mouth Daily., Disp: , Rfl:   •  Omega-3 Fatty Acids (OMEGA 3 500 PO), Take  by mouth. 520 mg daily, Disp: , Rfl:   •  traZODone (DESYREL) 50 MG tablet, Take 50 mg by mouth As Needed., Disp: , Rfl:     No Known Allergies     /65   Pulse 77   Temp 97.8 °F (36.6 °C)   Resp 16   Ht 157.5 cm (62\")   Wt 71.2 kg (157 lb)   BMI 28.72 kg/m²     Physical Exam   Constitutional: She is oriented to person, place, and time. She appears well-developed and well-nourished. No distress.   HENT:   Head: Normocephalic and atraumatic.   Right Ear: Hearing and external ear normal.   Left Ear: Hearing and external ear normal.   Nose: Nose normal.   Mouth/Throat: Oropharynx is clear and moist and mucous membranes are normal. Mucous membranes are not pale, not dry and not cyanotic. No oral lesions. No oropharyngeal exudate.   Eyes: Conjunctivae and EOM are normal. Right eye exhibits no discharge. Left eye exhibits no discharge.   Neck: Trachea normal. Neck supple. No JVD present. No edema present. No thyroid mass and no thyromegaly present.   Cardiovascular: Normal rate, regular rhythm, S2 normal and normal heart sounds. Exam reveals no gallop, no S3 and no friction rub.   No murmur heard.  Pulmonary/Chest: Effort normal and breath sounds normal. No respiratory distress. She exhibits no tenderness.   Abdominal: Normal appearance and bowel sounds are normal. She exhibits no distension, no ascites and no mass. There is no splenomegaly or hepatomegaly. There is no tenderness. There is no rigidity, no rebound and no guarding. No hernia.     Vascular Status -  Her right foot exhibits no edema. Her left foot exhibits no edema.  Lymphadenopathy:     She has no cervical " adenopathy.        Left: No supraclavicular adenopathy present.   Neurological: She is alert and oriented to person, place, and time. She has normal strength. No cranial nerve deficit or sensory deficit.   Skin: No rash noted. She is not diaphoretic. No cyanosis. No pallor. Nails show no clubbing.   Psychiatric: She has a normal mood and affect.   Nursing note and vitals reviewed.  Stigmata of chronic liver disease:  None.  Asterixis:  None.    Laboratory Testing:  Upon review of medical records:     Dated January 3, 2019 sodium 139 potassium 4.0 chloride 103 CO2 29 BUN 12 serum creatinine 0.82 glucose 107.  Calcium 10.1.  WBC 6.25 hemoglobin 12.1 hematocrit 37.5 MCV 84.3 and platelet count 338.     Dated April 16, 2019 sodium 144 potassium 4.2 chloride 100 CO2 25 BUN 21 serum creatinine 0.94 glucose 88.  Calcium 10.3.  Total protein 7.1.  Albumin 4.8.  T bili 0.2 AST 20 ALT 13 alkaline phosphatase 60.  Total cholesterol 215.  Triglycerides 48.  WBC 5.2 hemoglobin 11.8 hemoglobin 30.6 0.1 MCV 85 platelet count 346.    Dated 10/23/2019 glucose 96 BUN 14 creatinine 0.86 sodium 145 potassium 4.3 chloride 104 CO2 25 calcium 10.3 albumin 4.3 total bilirubin  <0.2 alkaline phosphatase 56 AST 19 ALT 11 WBC 5.0 hemoglobin 11.1 hematocrit 33.9 platelet count 362 MCV 83 hemoglobin A1c 6.0     Procedures:   Upon review of records:     Dated April 21, 2014 the patient underwent an upper endoscopy which revealed: Report unavailable.  Gastric biopsies revealed reactive gastropathy with focal surface erosion; special stain negative for Helicobacter.  Duodenal biopsies revealed no significant pathologic abnormalities.    Colonoscopy dated 10/16/2019 reveals scant pandiverticulosis.  Internal hemorrhoids.  Colon polyps.  2 were removed, 3 mm in size.  Angiodysplasia in the right colon.  One with a minimal ooze of blood.  Status post thermal ablation therapy using argon plasma coagulator.  Terminal ileum biopsy reveals unremarkable  small bowel mucosa.  Random colon biopsy reveals unremarkable colonic mucosa.  Negative for acute and microscopic colitis.  Cecum polyp biopsy reveals unremarkable colonic mucosa.  Rectal polyp biopsy reveals hyperplastic polyp.  Sigmoid colon polyp biopsy reveals lymphoid aggregate.    Assessment:      ICD-10-CM ICD-9-CM   1. Anemia, unspecified type D64.9 285.9   2. Angiodysplasia of colon K55.20 569.84   3. Polyp of colon, unspecified part of colon, unspecified type K63.5 211.3   4. Diverticulosis of colon K57.30 562.10   5. Internal hemorrhoids K64.8 455.0     Discussion:  1. History of mild anemia.  The patient has a history of angiodysplasia in the right colon, one with minimal oozing of blood. Of interest, the patient is on Plavix and aspirin. Concerns for angiodysplasia in the upper GI tract.    Plan/  Patient Instructions   1. High fiber diet with liberal water intake.   2. Follow up colonoscopy in 5 years.   3. Upper endoscopy-EGD: Description of the procedure, risks, benefits, alternatives and options, including nonoperative options, were discussed with the patient in detail. The patient understands and wishes to proceed.     RYLEY Cordoba

## 2019-12-27 ENCOUNTER — HOSPITAL ENCOUNTER (OUTPATIENT)
Dept: MAMMOGRAPHY | Facility: HOSPITAL | Age: 69
Discharge: HOME | End: 2019-12-27
Admitting: FAMILY MEDICINE

## 2019-12-27 DIAGNOSIS — Z12.39 BREAST CANCER SCREENING: ICD-10-CM

## 2019-12-27 PROCEDURE — 77067 SCR MAMMO BI INCL CAD: CPT

## 2019-12-27 PROCEDURE — 77063 BREAST TOMOSYNTHESIS BI: CPT

## 2020-01-07 ENCOUNTER — ANESTHESIA (OUTPATIENT)
Dept: GASTROENTEROLOGY | Facility: HOSPITAL | Age: 70
End: 2020-01-07

## 2020-01-07 ENCOUNTER — HOSPITAL ENCOUNTER (OUTPATIENT)
Facility: HOSPITAL | Age: 70
Setting detail: HOSPITAL OUTPATIENT SURGERY
Discharge: HOME | End: 2020-01-07
Attending: INTERNAL MEDICINE | Admitting: INTERNAL MEDICINE

## 2020-01-07 ENCOUNTER — ANESTHESIA EVENT (OUTPATIENT)
Dept: GASTROENTEROLOGY | Facility: HOSPITAL | Age: 70
End: 2020-01-07

## 2020-01-07 VITALS
WEIGHT: 153.8 LBS | HEIGHT: 62 IN | SYSTOLIC BLOOD PRESSURE: 101 MMHG | DIASTOLIC BLOOD PRESSURE: 63 MMHG | TEMPERATURE: 97.2 F | BODY MASS INDEX: 28.3 KG/M2 | OXYGEN SATURATION: 96 % | RESPIRATION RATE: 16 BRPM | HEART RATE: 58 BPM

## 2020-01-07 DIAGNOSIS — D64.9 ANEMIA, UNSPECIFIED TYPE: ICD-10-CM

## 2020-01-07 PROCEDURE — 43255 EGD CONTROL BLEEDING ANY: CPT | Performed by: INTERNAL MEDICINE

## 2020-01-07 PROCEDURE — S0260 H&P FOR SURGERY: HCPCS | Performed by: INTERNAL MEDICINE

## 2020-01-07 PROCEDURE — 25010000002 MIDAZOLAM PER 1MG: Performed by: NURSE ANESTHETIST, CERTIFIED REGISTERED

## 2020-01-07 PROCEDURE — 25010000002 PROPOFOL 1000 MG/100ML EMULSION: Performed by: NURSE ANESTHETIST, CERTIFIED REGISTERED

## 2020-01-07 PROCEDURE — 43239 EGD BIOPSY SINGLE/MULTIPLE: CPT | Performed by: INTERNAL MEDICINE

## 2020-01-07 PROCEDURE — 88305 TISSUE EXAM BY PATHOLOGIST: CPT | Performed by: INTERNAL MEDICINE

## 2020-01-07 PROCEDURE — 25010000002 FENTANYL CITRATE (PF) 100 MCG/2ML SOLUTION: Performed by: NURSE ANESTHETIST, CERTIFIED REGISTERED

## 2020-01-07 RX ORDER — PANTOPRAZOLE SODIUM 40 MG/1
TABLET, DELAYED RELEASE ORAL
Qty: 30 TABLET | Refills: 2 | Status: SHIPPED | OUTPATIENT
Start: 2020-01-07 | End: 2020-02-06 | Stop reason: SDUPTHER

## 2020-01-07 RX ORDER — SIMETHICONE 20 MG/.3ML
EMULSION ORAL AS NEEDED
Status: DISCONTINUED | OUTPATIENT
Start: 2020-01-07 | End: 2020-01-07 | Stop reason: HOSPADM

## 2020-01-07 RX ORDER — PROPOFOL 10 MG/ML
INJECTION, EMULSION INTRAVENOUS AS NEEDED
Status: DISCONTINUED | OUTPATIENT
Start: 2020-01-07 | End: 2020-01-07 | Stop reason: SURG

## 2020-01-07 RX ORDER — SODIUM CHLORIDE 9 MG/ML
70 INJECTION, SOLUTION INTRAVENOUS CONTINUOUS PRN
Status: DISCONTINUED | OUTPATIENT
Start: 2020-01-07 | End: 2020-01-07 | Stop reason: HOSPADM

## 2020-01-07 RX ORDER — FENTANYL CITRATE 50 UG/ML
INJECTION, SOLUTION INTRAMUSCULAR; INTRAVENOUS AS NEEDED
Status: DISCONTINUED | OUTPATIENT
Start: 2020-01-07 | End: 2020-01-07 | Stop reason: SURG

## 2020-01-07 RX ORDER — LIDOCAINE HYDROCHLORIDE 20 MG/ML
INJECTION, SOLUTION INTRAVENOUS AS NEEDED
Status: DISCONTINUED | OUTPATIENT
Start: 2020-01-07 | End: 2020-01-07 | Stop reason: SURG

## 2020-01-07 RX ORDER — MIDAZOLAM HYDROCHLORIDE 2 MG/2ML
INJECTION, SOLUTION INTRAMUSCULAR; INTRAVENOUS AS NEEDED
Status: DISCONTINUED | OUTPATIENT
Start: 2020-01-07 | End: 2020-01-07 | Stop reason: SURG

## 2020-01-07 RX ADMIN — MIDAZOLAM HYDROCHLORIDE 2 MG: 1 INJECTION, SOLUTION INTRAMUSCULAR; INTRAVENOUS at 07:40

## 2020-01-07 RX ADMIN — SODIUM CHLORIDE 70 ML/HR: 9 INJECTION, SOLUTION INTRAVENOUS at 06:37

## 2020-01-07 RX ADMIN — FENTANYL CITRATE 100 MCG: 50 INJECTION, SOLUTION INTRAMUSCULAR; INTRAVENOUS at 07:40

## 2020-01-07 RX ADMIN — PROPOFOL 50 MG: 10 INJECTION, EMULSION INTRAVENOUS at 08:01

## 2020-01-07 RX ADMIN — PROPOFOL 50 MG: 10 INJECTION, EMULSION INTRAVENOUS at 07:50

## 2020-01-07 RX ADMIN — PROPOFOL 50 MG: 10 INJECTION, EMULSION INTRAVENOUS at 07:40

## 2020-01-07 RX ADMIN — LIDOCAINE HYDROCHLORIDE 100 MG: 20 INJECTION, SOLUTION INTRAVENOUS at 07:40

## 2020-01-07 NOTE — ANESTHESIA POSTPROCEDURE EVALUATION
Patient: Annalise Corona    Procedure Summary     Date:  01/07/20 Room / Location:  [facility] ENDOSCOPY 2 / [facility] ENDOSCOPY    Anesthesia Start:  0741 Anesthesia Stop:  0818    Procedure:  ESOPHAGOGASTRODUODENOSCOPY (N/A Esophagus) Diagnosis:       Anemia, unspecified type      (Anemia, unspecified type [D64.9])    Surgeon:  Kirit Gilmore MD Provider:  Miguel Cook CRNA    Anesthesia Type:  MAC ASA Status:  3          Anesthesia Type: MAC    Vitals  Vitals Value Taken Time   BP 93/63 1/7/2020  8:20 AM   Temp 97.2 °F (36.2 °C) 1/7/2020  8:20 AM   Pulse 66 1/7/2020  8:20 AM   Resp 18 1/7/2020  8:20 AM   SpO2 96 % 1/7/2020  8:20 AM           Post Anesthesia Care and Evaluation    Patient location during evaluation: bedside  Patient participation: complete - patient participated  Level of consciousness: awake  Pain score: 0  Pain management: adequate  Airway patency: patent  Anesthetic complications: No anesthetic complications  PONV Status: controlled  Cardiovascular status: acceptable and stable  Respiratory status: acceptable and room air  Hydration status: acceptable

## 2020-01-07 NOTE — OP NOTE
PROCEDURE:  Upper Endoscopy with thermal ablation therapy using argon plasma coagulator of nonbleeding gastric and duodenal vascular ectasia as well as biopsies.    DATE OF PROCEDURE: January 7, 2020.    REFERRING PROVIDER:  Lisa Martinez MD.     INSTRUMENT:  Olympus GIF H 190 video endoscope     INDICATIONS OF THE PROCEDURE:    This is a 69-year-old -American female with history of anemia.     BIOPSIES: Second portion of duodenum.  Gastric antrum, body and fundus.       MEDICATIONS:  MAC.     PHOTOGRAPHS:  Photographs were included in the medical records.     CONSENT/PREPROCEDURE EVALUATION:  Risks, benefits, alternatives and options of the procedure including risks of anesthesia/sedation were discussed and informed consent was obtained prior to the procedure. History and physical examination were performed and nothing precluded the test.     REPORT:  The patient was placed in left lateral decubitus position. Once under the influence of IV sedation, the instrument was inserted into the mouth and esophagus was intubated under direct vision without difficulty.     Esophagus: Upper esophageal sphincter was noted to be tortuous.  Erythematous distal esophagitis was seen.  A nonobstructive Schatzki's type ring was noted.  Z line was noted to be around 36  cm.    A small sliding hiatal hernia less than 3 cm was noted.  No Saucedo's esophagus was seen.     Stomach:  Antrum:  Erythematous-erosive gastritis.  Angulus, lesser and greater curves: Normal.  Retroflex examination: Sliding hiatal hernia.  Cardia and fundus:  Normal.     Body of the stomach: Erythematous gastritis.  Good distensibility of the stomach was achieved no giant folds were noted.   Biopsies were obtained from the gastric antrum,  body and fundus.    Pylorus and pyloric channel:  normal.     Duodenum:  Bulb: Normal.  Second portion: normal.  Subtle scalloping was seen in the second portion of duodenum.  Biopsies were obtained from the second  portion of duodenum.    Intervention: Thermal ablation therapy was undertaken using argon plasma coagulator of nonbleeding gastric and duodenal vascular ectasia in the distal second portion of duodenum.     The upper GI tract was decompressed and the scope was pulled out of the patient. The patient tolerated the procedure well.     DIAGNOSES:     1. Erythematous distal esophagitis.  2. Nonobstructive Schatzki's type ring.  3. Small sliding hiatal hernia less than 3 cm.  4. Erythematous-erosive gastritis.  5. 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  Status post thermal ablation therapy using argon plasma coagulator.  6. In the presence of coagulopathy small nonbleeding vascular ectasias can explain anemia.      RECOMMENDATIONS:  1.  Dietary instructions.  2.  Pantoprazole 40 mg 1 p.o. q.a.m. 1/2 hour before breakfast.  3.  Follow biopsies.  4.  Follow up in office.     Thank you very much for letting me participate in the care of this patient. Please do not hesitate to call me if you have any questions.

## 2020-01-07 NOTE — ANESTHESIA PREPROCEDURE EVALUATION
Anesthesia Evaluation     Patient summary reviewed and Nursing notes reviewed   no history of anesthetic complications:  NPO Solid Status: > 8 hours  NPO Liquid Status: > 8 hours           Airway   Mallampati: II  TM distance: >3 FB  Neck ROM: full  Dental      Pulmonary    (+) decreased breath sounds,   (-) not a smoker  Cardiovascular     PT is on anticoagulation therapy    (+) hypertension, PVD, hyperlipidemia,       Neuro/Psych  (+) CVA,     GI/Hepatic/Renal/Endo      Musculoskeletal     Abdominal    Substance History      OB/GYN          Other   arthritis,                        Anesthesia Plan    ASA 3     MAC   (Risks and benefits discussed including risk of aspiration, recall and dental damage. All patient questions answered.    Will continue with plan of care.)  intravenous induction     Anesthetic plan, all risks, benefits, and alternatives have been provided, discussed and informed consent has been obtained with: patient.    Plan discussed with CRNA.

## 2020-01-07 NOTE — H&P
Chief complaint:  Anemia    History of present illness: Anemia      Past medical history:   Past Medical History:   Diagnosis Date   • Arthritis    • Body piercing     Both ears   • Borderline diabetes    • Colon polyp 10/16/2019   • Coronary artery disease 2020    Dr. Osman    • Elevated cholesterol    • History of exercise stress test    • Hyperlipidemia    • Hypertension    • PVD (peripheral vascular disease) (CMS/HCC)     12 years ago    • Stroke (CMS/HCC)    • Wears glasses        Surgical history:    Past Surgical History:   Procedure Laterality Date   • ANGIOPLASTY      stents after  per patient    • BREAST BIOPSY Left        • CARDIOVASCULAR STRESS TEST     • COLONOSCOPY N/A 10/16/2019    Procedure: COLONOSCOPY WITH ARGON THERMAL ABLATION, COLD BIOPSY POLYPECTOMIES, AND BIOPSIES;  Surgeon: Kirit Gilmore MD;  Location: [facility] ENDOSCOPY;  Service: Gastroenterology   • CORONARY ANGIOPLASTY WITH STENT PLACEMENT  2020   • HYSTERECTOMY     • INTERVENTIONAL RADIOLOGY PROCEDURE N/A 1/3/2019    Procedure: Abdominal Aortagram with Runoff;  Surgeon: Charan Osman MD;  Location: [facility] INVASIVE LOCATION;  Service: Cardiovascular   • OOPHORECTOMY     • UPPER GASTROINTESTINAL ENDOSCOPY  over 10 years ago       Social history:  Social History     Socioeconomic History   • Marital status:      Spouse name: Not on file   • Number of children: Not on file   • Years of education: Not on file   • Highest education level: Not on file   Tobacco Use   • Smoking status: Former Smoker     Last attempt to quit: 1/3/2013     Years since quittin.0   • Smokeless tobacco: Never Used   Substance and Sexual Activity   • Alcohol use: Yes     Comment: occassional   • Drug use: No   • Sexual activity: Defer       Allergies:  Patient has no known allergies.  Latex allergy: None  Contrast allergy: None    Medications:  Medications Prior to Admission   Medication Sig Dispense Refill Last  "Dose   • amLODIPine (NORVASC) 10 MG tablet Take 10 mg by mouth every night at bedtime.   1/6/2020 at 0930   • aspirin 81 MG EC tablet Take 81 mg by mouth Daily.   1/6/2020 at 0930   • atorvastatin (LIPITOR) 40 MG tablet Take 40 mg by mouth Every Night.   1/6/2020 at 2100   • calcium carbonate (CALCIUM 600) 600 MG tablet Take 600 mg by mouth 2 (Two) Times a Day With Meals.   1/6/2020 at 0930   • clopidogrel (PLAVIX) 75 MG tablet Take 75 mg by mouth Daily.   1/3/2020 at 0930   • irbesartan-hydrochlorothiazide (AVALIDE) 300-12.5 MG tablet Take 1 tablet by mouth Daily.   1/6/2020 at 0930   • Multiple Vitamin (MULTIVITAMINS PO) Take 1 tablet by mouth Daily.   1/6/2020 at 0930   • Omega-3 Fatty Acids (OMEGA 3 500 PO) Take 1 tablet by mouth Daily. 520 mg daily    1/6/2020 at 1230   • traZODone (DESYREL) 50 MG tablet Take 50 mg by mouth As Needed.   Past Week at Unknown time   • cholecalciferol (VITAMIN D3) 400 units tablet Take 400 Units by mouth 2 (Two) Times a Day.   Taking       Review of systems:   Constitutional: No recent: Fever, Weight loss  Respiratory: No recent: SOB, Cough  Cardiovascular: No recent: Chest Pains, congestive heart failure or arrhythmias.   Neurological: No recent: Seizures or TIA.   Genitourinary: No recent: Renal Failure, UTI.  Endocrine: No recent: Worsening of diabetes or thyroid disease.  Musculoskeletal:   No recent: Joint swelling.  Hem. Oncology: No recent: Bleeding.  Psychiatric: No recent: Worsening of depression or anxiety.     VITAL SIGNS:    Blood pressure 104/67, pulse 78, temperature 97 °F (36.1 °C), temperature source Tympanic, resp. rate 18, height 157.5 cm (62\"), weight 69.8 kg (153 lb 12.8 oz), SpO2 98 %.    PHYSICAL EXAMINATION:   HEENT: Normal.   Lungs: Clear to auscultation.  Heart: No S3, no murmur.    Abdomen: Soft.  BS+ ND, NT  Extremities: No edema.  No cyanosis.  Neuro: Alert X 3. No focal deficit.    Assessment: Anemia    Plan:  Upper Endoscopy     Risks/Benefits:  The " potential benefits, risk and/or side effects of the procedure and alternatives have been discussed with the patient/authorized representative and questions were answered.

## 2020-01-07 NOTE — DISCHARGE INSTRUCTIONS
No pushing, pulling, tugging,  heavy lifting, or strenuous activity.  No major decision making, driving, or drinking alcoholic beverages for 24 hours. ( due to the medications you have  received)  Always use good hand hygiene/washing techniques.  NO driving while taking pain medications.    To assist you in voiding:  Drink plenty of fluids  Listen to running water while attempting to void.    If you are unable to urinate and you have an uncomfortable urge to void or it has been   6 hours since you were discharged, return to the Emergency Room          F EGD REF *******************************************************    Postprocedure instructions.  1. Nothing by mouth for 30 min.  2. Clear liquids diet (No Sodas) for 1 hours.  3. May advance to soft low-fat diet  in 2 hours     Blood thinners:  Avoid Plavix (clopidogrel) for 6 days.  If no bleeding may resume Plavix (clopidogrel) on: Monday, January 13, 2020.    Diet otherwise:    1. Low fat diet.    2. Avoid soda beverages, excessive use of coffee and tea.   3. Avoid smoking and alcohol.      Other Instructions:  Call [facility] at [phone] or come to the Emergency Department if you experience the following: Chest pain, abdominal pain, bleeding (vomiting of blood or coffee colored material, black stools or jarvis blood in stools),   fever/chills, nausea and vomiting or dizziness.    Follow-up:    Dr. Gilmore in 3-4 weeks.   Office phone #[phone].   If you already have an appointment just keep that appointment.    ************************************************************************************    Notes to the patient and the family from Dr. Gilmore.    Dear patient/family member,    Findings on today's procedure are as follows:  1. Esophagitis. Inflammation of the esophagus.  2. Inflammation of the stomach. Gastritis.    3. Small sliding hiatal hernia.    4. No cancer. No active ulcers.  5. Small nonbleeding angiodysplasia involving the  stomach and the second portion of duodenum.  These are small blood vessels that can be seen in the intestines.  These areas were treated with thermal ablation using argon plasma coagulator.    Recommendations:    1. Protonix (Pantoprazole) tablet 40 mg tablet. Take 1 tablet orally in the morning half an hour before eating every day.  2. Zofran 4 mg tablet.  1-to 3 times a day by mouth as needed for nausea.   3. Other instructions as above.      Should you have more questions please do not hesitate to talk to the nurse who can call me and let me talk to you.      I hope you feel better.    Kirit Gilmore M.D., FACP, FACG.

## 2020-01-09 LAB
LAB AP CASE REPORT: NORMAL
PATH REPORT.FINAL DX SPEC: NORMAL

## 2020-02-06 ENCOUNTER — OFFICE VISIT (OUTPATIENT)
Dept: GASTROENTEROLOGY | Facility: CLINIC | Age: 70
End: 2020-02-06

## 2020-02-06 VITALS
HEIGHT: 62 IN | SYSTOLIC BLOOD PRESSURE: 148 MMHG | TEMPERATURE: 97.1 F | BODY MASS INDEX: 27.97 KG/M2 | WEIGHT: 152 LBS | DIASTOLIC BLOOD PRESSURE: 78 MMHG | RESPIRATION RATE: 15 BRPM | HEART RATE: 68 BPM

## 2020-02-06 DIAGNOSIS — K20.90 ESOPHAGITIS: ICD-10-CM

## 2020-02-06 DIAGNOSIS — K29.50 CHRONIC GASTRITIS WITHOUT BLEEDING, UNSPECIFIED GASTRITIS TYPE: ICD-10-CM

## 2020-02-06 DIAGNOSIS — D64.9 ANEMIA, UNSPECIFIED TYPE: Primary | ICD-10-CM

## 2020-02-06 PROCEDURE — 99213 OFFICE O/P EST LOW 20 MIN: CPT | Performed by: NURSE PRACTITIONER

## 2020-02-06 RX ORDER — PANTOPRAZOLE SODIUM 40 MG/1
TABLET, DELAYED RELEASE ORAL
Qty: 90 TABLET | Refills: 1 | Status: SHIPPED | OUTPATIENT
Start: 2020-02-06 | End: 2020-06-11

## 2020-02-06 NOTE — PATIENT INSTRUCTIONS
1. Antireflux measures: Avoid fried, fatty foods, alcohol, chocolate, coffee, tea,  soft drinks, peppermint and spearmint, spicy foods, tomatoes and tomato based foods, onion based foods, and smoking. Other antireflux measures include weight reduction if overweight, avoiding tight clothing around the abdomen, elevating the head of the bed 6 inches with blocks under the head board, and don't drink or eat before going to bed and avoid lying down immediately after meals.  2. Pantoprazole 40 mg 1 by mouth in the am 30 minutes before breakfast.  3. CBC-The patient wishes to pursue through PCP office.   4. Follow up: The patient will call back

## 2020-02-06 NOTE — PROGRESS NOTES
Chief Complaint   Patient presents with   • Follow-up     The patient is here for follow up. She has been doing very well. She has a history of anemia. The patient denies overt GI bleed, no hematemesis, hematochezia or melena. The patient denies recent change in bowel habits. There is no diarrhea or constipation. There is no history of abdominal pain. There is no history of overt GI bleed (hematemesis melena or hematochezia). The patient denies nausea or vomiting. There is no history of reflux. The patient denies dysphagia or odynophagia. There is no history of recent significant weight loss. There is no history of liver disease in the past.     Anemia   Presents for follow-up visit. There has been no abdominal pain, bruising/bleeding easily, fever, palpitations or weight loss. Signs of blood loss that are not present include hematemesis, hematochezia, melena and vaginal bleeding.     Review of Systems   Constitutional: Negative for appetite change, chills, fatigue, fever, unexpected weight change and weight loss.   HENT: Negative for mouth sores, nosebleeds and trouble swallowing.    Eyes: Negative for discharge and redness.   Respiratory: Negative for apnea, cough and shortness of breath.    Cardiovascular: Negative for chest pain, palpitations and leg swelling.   Gastrointestinal: Negative for abdominal distention, abdominal pain, anal bleeding, blood in stool, constipation, diarrhea, hematemesis, hematochezia, melena, nausea and vomiting.   Endocrine: Negative for cold intolerance, heat intolerance and polydipsia.   Genitourinary: Negative for dysuria, hematuria, urgency and vaginal bleeding.   Musculoskeletal: Negative for arthralgias, joint swelling and myalgias.   Skin: Negative for rash.   Allergic/Immunologic: Negative for food allergies and immunocompromised state.   Neurological: Negative for dizziness, seizures, syncope and headaches.   Hematological: Negative for adenopathy. Does not bruise/bleed  easily.   Psychiatric/Behavioral: Negative for dysphoric mood. The patient is not nervous/anxious and is not hyperactive.      Patient Active Problem List   Diagnosis   • Atherosclerosis with limb claudication (CMS/HCC)   • History of colon polyps   • Anemia   • Diabetes mellitus (CMS/HCC)   • Essential hypertension   • Hyperlipidemia   • Peripheral vascular disease (CMS/HCC)   • Coronary arteriosclerosis   • Angiodysplasia of colon   • Polyp of colon   • Diverticulosis of colon   • Internal hemorrhoids     Past Medical History:   Diagnosis Date   • Arthritis    • Body piercing     Both ears   • Borderline diabetes    • Colon polyp 10/16/2019   • Coronary artery disease 01/02/2020    Dr. Osman    • Elevated cholesterol    • History of exercise stress test    • Hyperlipidemia    • Hypertension    • PVD (peripheral vascular disease) (CMS/HCC)     12 years ago    • Stroke (CMS/HCC)    • Wears glasses      Past Surgical History:   Procedure Laterality Date   • ANGIOPLASTY      stents after 2007 per patient    • BREAST BIOPSY Left     2001   • CARDIOVASCULAR STRESS TEST     • COLONOSCOPY N/A 10/16/2019    Procedure: COLONOSCOPY WITH ARGON THERMAL ABLATION, COLD BIOPSY POLYPECTOMIES, AND BIOPSIES;  Surgeon: Kirit Gilmore MD;  Location: [facility] ENDOSCOPY;  Service: Gastroenterology   • CORONARY ANGIOPLASTY WITH STENT PLACEMENT  01/02/2020 2007   • ENDOSCOPY N/A 1/7/2020    Procedure: ESOPHAGOGASTRODUODENOSCOPY;  Surgeon: Kirit Gilmore MD;  Location: [facility] ENDOSCOPY;  Service: Gastroenterology   • HYSTERECTOMY     • INTERVENTIONAL RADIOLOGY PROCEDURE N/A 1/3/2019    Procedure: Abdominal Aortagram with Runoff;  Surgeon: Charan Osman MD;  Location: [facility] INVASIVE LOCATION;  Service: Cardiovascular   • OOPHORECTOMY     • UPPER GASTROINTESTINAL ENDOSCOPY  over 10 years ago   • UPPER GASTROINTESTINAL ENDOSCOPY  01/07/2020     Family History   Problem Relation Age of Onset   • Colon cancer Father    • Lung  "cancer Father    • Breast cancer Neg Hx      Social History     Tobacco Use   • Smoking status: Former Smoker     Last attempt to quit: 1/3/2013     Years since quittin.0   • Smokeless tobacco: Never Used   Substance Use Topics   • Alcohol use: Yes     Comment: occassional       Current Outpatient Medications:   •  amLODIPine (NORVASC) 10 MG tablet, Take 10 mg by mouth every night at bedtime., Disp: , Rfl:   •  aspirin 81 MG EC tablet, Take 81 mg by mouth Daily., Disp: , Rfl:   •  atorvastatin (LIPITOR) 40 MG tablet, Take 40 mg by mouth Every Night., Disp: , Rfl:   •  calcium carbonate (CALCIUM 600) 600 MG tablet, Take 600 mg by mouth 2 (Two) Times a Day With Meals., Disp: , Rfl:   •  cholecalciferol (VITAMIN D3) 400 units tablet, Take 400 Units by mouth 2 (Two) Times a Day., Disp: , Rfl:   •  irbesartan-hydrochlorothiazide (AVALIDE) 300-12.5 MG tablet, Take 1 tablet by mouth Daily., Disp: , Rfl:   •  Multiple Vitamin (MULTIVITAMINS PO), Take 1 tablet by mouth Daily., Disp: , Rfl:   •  Omega-3 Fatty Acids (OMEGA 3 500 PO), Take 1 tablet by mouth Daily. 520 mg daily , Disp: , Rfl:   •  pantoprazole (PROTONIX) 40 MG EC tablet, Take 1 tablet by mouth 30 minutes before breakfast daily., Disp: 90 tablet, Rfl: 1  •  traZODone (DESYREL) 50 MG tablet, Take 50 mg by mouth As Needed., Disp: , Rfl:     No Known Allergies     /78   Pulse 68   Temp 97.1 °F (36.2 °C)   Resp 15   Ht 157.5 cm (62\")   Wt 68.9 kg (152 lb)   BMI 27.80 kg/m²     Physical Exam   Constitutional: She is oriented to person, place, and time. She appears well-developed and well-nourished. No distress.   HENT:   Head: Normocephalic and atraumatic.   Right Ear: Hearing and external ear normal.   Left Ear: Hearing and external ear normal.   Nose: Nose normal.   Mouth/Throat: Oropharynx is clear and moist and mucous membranes are normal. Mucous membranes are not pale, not dry and not cyanotic. No oral lesions. No oropharyngeal exudate.   Eyes: " Conjunctivae and EOM are normal. Right eye exhibits no discharge. Left eye exhibits no discharge.   Neck: Trachea normal. Neck supple. No JVD present. No edema present. No thyroid mass and no thyromegaly present.   Cardiovascular: Normal rate, regular rhythm, S2 normal and normal heart sounds. Exam reveals no gallop, no S3 and no friction rub.   No murmur heard.  Pulmonary/Chest: Effort normal and breath sounds normal. No respiratory distress. She exhibits no tenderness.   Abdominal: Normal appearance and bowel sounds are normal. She exhibits no distension, no ascites and no mass. There is no splenomegaly or hepatomegaly. There is no tenderness. There is no rigidity, no rebound and no guarding. No hernia.     Vascular Status -  Her right foot exhibits no edema. Her left foot exhibits no edema.  Lymphadenopathy:     She has no cervical adenopathy.        Left: No supraclavicular adenopathy present.   Neurological: She is alert and oriented to person, place, and time. She has normal strength. No cranial nerve deficit or sensory deficit.   Skin: No rash noted. She is not diaphoretic. No cyanosis. No pallor. Nails show no clubbing.   Psychiatric: She has a normal mood and affect.   Nursing note and vitals reviewed.  Stigmata of chronic liver disease:  None.  Asterixis:  None.    Laboratory Testing:  Upon review of medical records:     Dated January 3, 2019 sodium 139 potassium 4.0 chloride 103 CO2 29 BUN 12 serum creatinine 0.82 glucose 107.  Calcium 10.1.  WBC 6.25 hemoglobin 12.1 hematocrit 37.5 MCV 84.3 and platelet count 338.     Dated April 16, 2019 sodium 144 potassium 4.2 chloride 100 CO2 25 BUN 21 serum creatinine 0.94 glucose 88.  Calcium 10.3.  Total protein 7.1.  Albumin 4.8.  T bili 0.2 AST 20 ALT 13 alkaline phosphatase 60.  Total cholesterol 215.  Triglycerides 48.  WBC 5.2 hemoglobin 11.8 hemoglobin 30.6 0.1 MCV 85 platelet count 346.     Dated 10/23/2019 glucose 96 BUN 14 creatinine 0.86 sodium 145  potassium 4.3 chloride 104 CO2 25 calcium 10.3 albumin 4.3 total bilirubin  <0.2 alkaline phosphatase 56 AST 19 ALT 11 WBC 5.0 hemoglobin 11.1 hematocrit 33.9 platelet count 362 MCV 83 hemoglobin A1c 6.0     Procedures:   Upon review of records:     Dated April 21, 2014 the patient underwent an upper endoscopy which revealed: Report unavailable.  Gastric biopsies revealed reactive gastropathy with focal surface erosion; special stain negative for Helicobacter.  Duodenal biopsies revealed no significant pathologic abnormalities.     Colonoscopy dated 10/16/2019 reveals scant pandiverticulosis.  Internal hemorrhoids.  Colon polyps.  2 were removed, 3 mm in size.  Angiodysplasia in the right colon.  One with a minimal ooze of blood.  Status post thermal ablation therapy using argon plasma coagulator.  Terminal ileum biopsy reveals unremarkable small bowel mucosa.  Random colon biopsy reveals unremarkable colonic mucosa.  Negative for acute and microscopic colitis.  Cecum polyp biopsy reveals unremarkable colonic mucosa.  Rectal polyp biopsy reveals hyperplastic polyp.  Sigmoid colon polyp biopsy reveals lymphoid aggregate.    EGD dated 1/7/2020 reveals erythematous distal esophagitis.  Nonobstructive Schatzki's type ring.  Small sliding hiatal hernia less than 3 cm.  Erythematous erosive gastritis.  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  Status post thermal ablation therapy using argon plasma coagulator.  In the presence of coagulopathy small nonbleeding vascular ectasias can explain anemia.  Second portion of duodenum biopsy reveals small bowel mucosa with no significant pathologic abnormality.  Antrum body and fundus biopsy reveals gastric mucosa with mild reactive gastropathy.  No H. pylori, intestinal metaplasia or dysplasia.    Assessment:      ICD-10-CM ICD-9-CM   1. Anemia, unspecified type D64.9 285.9   2. Esophagitis K20.9 530.10   3. Chronic gastritis without bleeding, unspecified gastritis  type K29.50 535.10     Plan/  Patient Instructions   1. Antireflux measures: Avoid fried, fatty foods, alcohol, chocolate, coffee, tea,  soft drinks, peppermint and spearmint, spicy foods, tomatoes and tomato based foods, onion based foods, and smoking. Other antireflux measures include weight reduction if overweight, avoiding tight clothing around the abdomen, elevating the head of the bed 6 inches with blocks under the head board, and don't drink or eat before going to bed and avoid lying down immediately after meals.  2. Pantoprazole 40 mg 1 by mouth in the am 30 minutes before breakfast.  3. CBC-The patient wishes to pursue through PCP office.   4. Follow up: The patient will call back     RYLEY Cordoba

## 2020-06-11 DIAGNOSIS — D64.9 ANEMIA, UNSPECIFIED TYPE: ICD-10-CM

## 2020-06-11 DIAGNOSIS — K29.50 CHRONIC GASTRITIS WITHOUT BLEEDING, UNSPECIFIED GASTRITIS TYPE: ICD-10-CM

## 2020-06-11 DIAGNOSIS — K20.90 ESOPHAGITIS: ICD-10-CM

## 2020-06-11 RX ORDER — PANTOPRAZOLE SODIUM 40 MG/1
TABLET, DELAYED RELEASE ORAL
Qty: 90 TABLET | Refills: 1 | Status: SHIPPED | OUTPATIENT
Start: 2020-06-11

## 2020-11-16 ENCOUNTER — TRANSCRIBE ORDERS (OUTPATIENT)
Dept: ADMINISTRATIVE | Facility: HOSPITAL | Age: 70
End: 2020-11-16

## 2020-11-16 DIAGNOSIS — Z12.31 VISIT FOR SCREENING MAMMOGRAM: Primary | ICD-10-CM

## 2021-01-13 ENCOUNTER — HOSPITAL ENCOUNTER (OUTPATIENT)
Dept: MAMMOGRAPHY | Facility: HOSPITAL | Age: 71
Discharge: HOME | End: 2021-01-13
Admitting: FAMILY MEDICINE

## 2021-01-13 DIAGNOSIS — Z12.31 VISIT FOR SCREENING MAMMOGRAM: ICD-10-CM

## 2021-01-13 PROCEDURE — 77067 SCR MAMMO BI INCL CAD: CPT

## 2021-01-13 PROCEDURE — 77063 BREAST TOMOSYNTHESIS BI: CPT

## 2021-08-26 ENCOUNTER — TRANSCRIBE ORDERS (OUTPATIENT)
Dept: ADMINISTRATIVE | Facility: HOSPITAL | Age: 71
End: 2021-08-26

## 2021-08-26 DIAGNOSIS — Z78.0 ASYMPTOMATIC POSTMENOPAUSAL STATE: Primary | ICD-10-CM

## 2021-11-24 ENCOUNTER — HOSPITAL ENCOUNTER (OUTPATIENT)
Dept: BONE DENSITY | Facility: HOSPITAL | Age: 71
Discharge: HOME | End: 2021-11-24
Admitting: FAMILY MEDICINE

## 2021-11-24 DIAGNOSIS — Z78.0 ASYMPTOMATIC POSTMENOPAUSAL STATE: ICD-10-CM

## 2021-11-24 PROCEDURE — 77080 DXA BONE DENSITY AXIAL: CPT

## 2021-12-01 ENCOUNTER — TRANSCRIBE ORDERS (OUTPATIENT)
Dept: ADMINISTRATIVE | Facility: HOSPITAL | Age: 71
End: 2021-12-01

## 2021-12-01 DIAGNOSIS — Z12.31 VISIT FOR SCREENING MAMMOGRAM: Primary | ICD-10-CM

## 2022-01-21 ENCOUNTER — HOSPITAL ENCOUNTER (OUTPATIENT)
Dept: MAMMOGRAPHY | Facility: HOSPITAL | Age: 72
Discharge: HOME | End: 2022-01-21
Admitting: FAMILY MEDICINE

## 2022-01-21 DIAGNOSIS — Z12.31 VISIT FOR SCREENING MAMMOGRAM: ICD-10-CM

## 2022-01-21 PROCEDURE — 77063 BREAST TOMOSYNTHESIS BI: CPT

## 2022-01-21 PROCEDURE — 77067 SCR MAMMO BI INCL CAD: CPT

## 2022-12-08 ENCOUNTER — OFFICE VISIT (OUTPATIENT)
Dept: GASTROENTEROLOGY | Facility: CLINIC | Age: 72
End: 2022-12-08

## 2022-12-08 VITALS
HEART RATE: 71 BPM | WEIGHT: 145.2 LBS | HEIGHT: 62 IN | BODY MASS INDEX: 26.72 KG/M2 | DIASTOLIC BLOOD PRESSURE: 78 MMHG | SYSTOLIC BLOOD PRESSURE: 132 MMHG | OXYGEN SATURATION: 99 %

## 2022-12-08 DIAGNOSIS — D50.0 IRON DEFICIENCY ANEMIA DUE TO CHRONIC BLOOD LOSS: Primary | ICD-10-CM

## 2022-12-08 DIAGNOSIS — Z86.010 HISTORY OF COLON POLYPS: ICD-10-CM

## 2022-12-08 DIAGNOSIS — Z87.19 HISTORY OF ANGIODYSPLASIA OF INTESTINAL TRACT: ICD-10-CM

## 2022-12-08 PROCEDURE — 99214 OFFICE O/P EST MOD 30 MIN: CPT | Performed by: PHYSICIAN ASSISTANT

## 2022-12-08 RX ORDER — SODIUM CHLORIDE 9 MG/ML
30 INJECTION, SOLUTION INTRAVENOUS CONTINUOUS PRN
Status: CANCELLED | OUTPATIENT
Start: 2022-12-08

## 2022-12-08 RX ORDER — SODIUM, POTASSIUM,MAG SULFATES 17.5-3.13G
SOLUTION, RECONSTITUTED, ORAL ORAL
Qty: 354 ML | Refills: 0 | Status: SHIPPED | OUTPATIENT
Start: 2022-12-08 | End: 2023-02-15 | Stop reason: HOSPADM

## 2022-12-08 RX ORDER — CLOPIDOGREL BISULFATE 75 MG/1
75 TABLET ORAL DAILY
COMMUNITY
Start: 2022-11-28

## 2022-12-08 RX ORDER — DOXYCYCLINE HYCLATE 50 MG/1
324 CAPSULE, GELATIN COATED ORAL
COMMUNITY
Start: 2022-11-21

## 2022-12-08 NOTE — PROGRESS NOTES
Follow Up Note     Date: 2022   Patient Name: Annalise Corona  MRN: [id number]  : 1950     Primary Care Provider: Lisa Martinez MD     Chief Complaint   Patient presents with   • Anemia     History of present illness:   2022  Annalise Corona is a 71 y.o. female who is here today as an established GI patient (last seen in ) for evaluation of Anemia.    Was told recently by her PCP that she has anemia. She has not had blood transfusion in the past. She was recently started on iron supplement only a few weeks ago. Prior to that, she had noticed some fatigue but had not had any rectal bleeding or black stool. No abdominal pain. No appetite changes. No nausea or vomiting. No significant reflux symptoms. No weight loss. Having bowel movements daily, no constipation or diarrhea. No recent abdominal imaging. Takes ASA and plavix daily. Last labs were a few weeks ago with PCP. Taking Protonix 40 mg once daily and has for years.     Last colonoscopy was in  and does have history of colon polyps plus angiodysplasia of the right colon. Last EGD was in  and she had vascular ectasias in stomach and duodenum. She does not recall these findings.     Interval History:  2019 Dr. Gilmore  For colon cancer screening.  The patient denies recent change in bowel habits.  There is no diarrhea or constipation.  There is no history of abdominal pain.  There is no history of overt GI bleed (hematemesis melena or hematochezia).  The patient denies nausea or vomiting.  There is no history of reflux.  The patient denies dysphagia or odynophagia.  There is no history of recent significant weight loss.  There is no history of liver or pancreatic disease.     The patient had undergone a colonoscopy about 5 years ago in [facility].  There is history of colon polyps.  Unfortunately, the details are not known.  The patient was recommended to have a follow-up colonoscopy in 5 years.  Recently, in May 2019  the patient was noted to be mildly anemic with hemoglobin of 11.6.  There is no history of gross hematuria, nosebleeds or vaginal bleeds.  There is no associated weakness, dizziness, chest pains or excessive shortness of breath.     The patient has history of peripheral vascular disease as well as coronary artery disease for which she had stents placed in the past.  Currently the patient is on aspirin and Plavix.    Subjective     Past Medical History:   Diagnosis Date   • Arthritis    • Body piercing     Both ears   • Borderline diabetes    • Colon polyp 10/16/2019   • Coronary artery disease 01/02/2020    Dr. Osman    • Elevated cholesterol    • History of exercise stress test    • Hyperlipidemia    • Hypertension    • PVD (peripheral vascular disease) (HCC)     12 years ago    • Stroke (HCC)    • Wears glasses      Past Surgical History:   Procedure Laterality Date   • ANGIOPLASTY      stents after 2007 per patient    • BREAST BIOPSY Left     2001   • CARDIOVASCULAR STRESS TEST     • COLONOSCOPY N/A 10/16/2019    Procedure: COLONOSCOPY WITH ARGON THERMAL ABLATION, COLD BIOPSY POLYPECTOMIES, AND BIOPSIES;  Surgeon: Kirit Gilmore MD;  Location: [facility] ENDOSCOPY;  Service: Gastroenterology   • CORONARY ANGIOPLASTY WITH STENT PLACEMENT  01/02/2020 2007   • ENDOSCOPY N/A 1/7/2020    Procedure: ESOPHAGOGASTRODUODENOSCOPY;  Surgeon: Kirit Gilmore MD;  Location: [facility] ENDOSCOPY;  Service: Gastroenterology   • HYSTERECTOMY     • INTERVENTIONAL RADIOLOGY PROCEDURE N/A 1/3/2019    Procedure: Abdominal Aortagram with Runoff;  Surgeon: Charan Osman MD;  Location: [facility] INVASIVE LOCATION;  Service: Cardiovascular   • OOPHORECTOMY     • UPPER GASTROINTESTINAL ENDOSCOPY  over 10 years ago   • UPPER GASTROINTESTINAL ENDOSCOPY  01/07/2020     Family History   Problem Relation Age of Onset   • Hypertension Mother    • Colon cancer Father    • Lung cancer Father    • Breast cancer Neg Hx      Social History      Socioeconomic History   • Marital status:    Tobacco Use   • Smoking status: Former     Types: Cigarettes     Quit date: 1/3/2013     Years since quittin.9   • Smokeless tobacco: Never   Vaping Use   • Vaping Use: Never used   Substance and Sexual Activity   • Alcohol use: Yes     Comment: occassional   • Drug use: No   • Sexual activity: Defer       Current Outpatient Medications:   •  amLODIPine (NORVASC) 10 MG tablet, Take 10 mg by mouth every night at bedtime., Disp: , Rfl:   •  aspirin 81 MG EC tablet, Take 81 mg by mouth Daily., Disp: , Rfl:   •  atorvastatin (LIPITOR) 40 MG tablet, Take 40 mg by mouth Every Night., Disp: , Rfl:   •  calcium carbonate (OS-PASCALE) 600 MG tablet, Take 600 mg by mouth 2 (Two) Times a Day With Meals., Disp: , Rfl:   •  cholecalciferol (VITAMIN D3) 400 units tablet, Take 400 Units by mouth 2 (Two) Times a Day., Disp: , Rfl:   •  clopidogrel (PLAVIX) 75 MG tablet, , Disp: , Rfl:   •  ferrous gluconate (FERGON) 324 MG tablet, , Disp: , Rfl:   •  irbesartan-hydrochlorothiazide (AVALIDE) 300-12.5 MG tablet, Take 1 tablet by mouth Daily., Disp: , Rfl:   •  Multiple Vitamin (MULTIVITAMINS PO), Take 1 tablet by mouth Daily., Disp: , Rfl:   •  Omega-3 Fatty Acids (OMEGA 3 500 PO), Take 1 tablet by mouth Daily. 520 mg daily , Disp: , Rfl:   •  pantoprazole (PROTONIX) 40 MG EC tablet, TAKE 1 TABLET BY MOUTH 30 MINUTES BEFORE BREAKFAST DAILY., Disp: 90 tablet, Rfl: 1  •  traZODone (DESYREL) 50 MG tablet, Take 50 mg by mouth As Needed., Disp: , Rfl:   •  sodium-potassium-magnesium sulfates (SUPREP) 17.5-3.13-1.6 GM/177ML solution oral solution, Take 2 bottles by mouth 1 (One) Time for 1 dose. Please see prep instructions from office, Disp: 354 mL, Rfl: 0    No Known Allergies    The following portions of the patient's history were reviewed and updated as appropriate: allergies, current medications, past family history, past medical history, past social history, past surgical  "history and problem list.    Objective     Physical Exam  Vitals reviewed.   Constitutional:       General: She is not in acute distress.     Appearance: Normal appearance. She is well-developed. She is not ill-appearing or diaphoretic.      Comments: pleasant   HENT:      Head: Normocephalic and atraumatic.      Right Ear: External ear normal.      Left Ear: External ear normal.      Ears:      Comments: [place]     Nose: Nose normal.      Mouth/Throat:      Comments: Wearing a mask  Eyes:      General: No scleral icterus.        Right eye: No discharge.         Left eye: No discharge.      Conjunctiva/sclera: Conjunctivae normal.   Neck:      Vascular: No JVD.   Cardiovascular:      Rate and Rhythm: Normal rate and regular rhythm.      Heart sounds: Normal heart sounds. No murmur heard.    No friction rub. No gallop.   Pulmonary:      Effort: Pulmonary effort is normal. No respiratory distress.      Breath sounds: Normal breath sounds. No wheezing or rales.   Chest:      Chest wall: No tenderness.   Abdominal:      General: Bowel sounds are normal. There is no distension.      Palpations: Abdomen is soft. There is no mass.      Tenderness: There is no abdominal tenderness. There is no guarding.   Musculoskeletal:         General: No deformity. Normal range of motion.      Cervical back: Normal range of motion.   Skin:     General: Skin is warm and dry.      Findings: No erythema or rash.   Neurological:      Mental Status: She is alert and oriented to person, place, and time.      Coordination: Coordination normal.   Psychiatric:         Mood and Affect: Mood normal.         Behavior: Behavior normal.         Thought Content: Thought content normal.         Judgment: Judgment normal.       Vitals:    12/08/22 0910   BP: 132/78   Pulse: 71   SpO2: 99%   Weight: 65.9 kg (145 lb 3.2 oz)   Height: 157.5 cm (62\")     Results Review:   I reviewed the patient's new clinical results.    Dated April 21, 2014 the patient " underwent an upper endoscopy which revealed: Report unavailable.  Gastric biopsies revealed reactive gastropathy with focal surface erosion; special stain negative for Helicobacter.  Duodenal biopsies revealed no significant pathologic abnormalities.     Colonoscopy dated 10/16/2019 reveals scant pandiverticulosis.  Internal hemorrhoids.  Colon polyps.  2 were removed, 3 mm in size.  Angiodysplasia in the right colon.  One with a minimal ooze of blood.  Status post thermal ablation therapy using argon plasma coagulator.  Terminal ileum biopsy reveals unremarkable small bowel mucosa.  Random colon biopsy reveals unremarkable colonic mucosa.  Negative for acute and microscopic colitis.  Cecum polyp biopsy reveals unremarkable colonic mucosa.  Rectal polyp biopsy reveals hyperplastic polyp.  Sigmoid colon polyp biopsy reveals lymphoid aggregate.     EGD dated 1/7/2020 reveals erythematous distal esophagitis.  Nonobstructive Schatzki's type ring.  Small sliding hiatal hernia less than 3 cm.  Erythematous erosive gastritis.  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  Status post thermal ablation therapy using argon plasma coagulator.  In the presence of coagulopathy small nonbleeding vascular ectasias can explain anemia.  Second portion of duodenum biopsy reveals small bowel mucosa with no significant pathologic abnormality.  Antrum body and fundus biopsy reveals gastric mucosa with mild reactive gastropathy.  No H. pylori, intestinal metaplasia or dysplasia.    Labs 11/8/2022: Cr 0.86, AST 28, ALT 17, alk phos 71, bili 0.3, Hgb 8.4, HCT 28.1, MCV 76, platelets 381,000, total cholesterol 212, TG 59, LDL 97, ferritin 9, vit B12 1497, folate >20,  iron 23, TIBC 457, iron saturation 5%.    No recent abdominal imaging to review.      Assessment / Plan      1. Iron deficiency anemia due to chronic blood loss  2. History of angiodysplasia of intestinal tract  She was told recently by her PCP that her Hgb dropped  from 10 to now 8 g/dL. She had not seen any black stools or bright red blood per rectum. She has noticed more fatigue recently. Was started on daily iron supplement by PCP which she has been taking as directed. She has not had any recent abdominal imaging. Labs 11/2022 showed Hgb at 8.4. On previous EGD in 2020 she had vascular ectasia in stomach and duodenum. On previous colonoscopy in 2019, she had angiodysplasia in the right colon. These were treated at that time but with her current anemia, it is very likely that she has had reoccurrence of these findings contributing to the drop in Hgb. She does take ASA and plavix daily.     EGD and colonoscopy will be arranged  Labs again within 1 month  Continue iron supplement PO once daily  Report to ER with any severe symptoms including SOA, weakness, dizziness, black tarry stools or rectal bleeding  Will likely need CTAP depending on endoscopy results    Addendum- discussed case with Dr. Daugherty and will also recommend that patient hold ASA for now until procedures can be completed.     - CBC (No Diff); Future    She will have an esophagogastroduodenoscopy and colonoscopy performed with monitored anesthesia care. The indications, technique, alternatives and potential risk and complications were discussed with the patient including but not limited to bleeding, bowel perforations, missing lesions and anesthetic complications. The patient understands and wishes to proceed with the procedure and has given their verbal consent. Written patient education information was given to the patient. She should follow up in the office after this procedure to discuss the results and further recommendations can be made at that time. The patient will call if they have further questions before procedure.  - Case Request  - sodium-potassium-magnesium sulfates (SUPREP) 17.5-3.13-1.6 GM/177ML solution oral solution; Take 2 bottles by mouth 1 (One) Time for 1 dose. Please see prep  instructions from office  Dispense: 354 mL; Refill: 0    3. History of colon polyps  Her last colonoscopy was 10/2019 and he had benign colon polyps removed at that time by Dr. Gilmore. There is no known family history of colon cancer or colon polyps. Was directed to repeat colonoscopy in 5 years but due to anemia with recent Hgb drop and history of colonic angiodysplasia, will go ahead and have repeat colonoscopy now.           Follow Up:   Return for follow up after procedure to discuss results.      Ashlee Marks PA-C  [facility] [place]  12/8/2022  12:02 EST    Dictated Utilizing Dragon Dictation: Part of this note may be an electronic transcription/translation of spoken language to printed text using the Dragon Dictation System.

## 2022-12-09 ENCOUNTER — TRANSCRIBE ORDERS (OUTPATIENT)
Dept: ADMINISTRATIVE | Facility: HOSPITAL | Age: 72
End: 2022-12-09

## 2022-12-09 ENCOUNTER — TELEPHONE (OUTPATIENT)
Dept: GASTROENTEROLOGY | Facility: CLINIC | Age: 72
End: 2022-12-09

## 2022-12-09 DIAGNOSIS — Z12.31 VISIT FOR SCREENING MAMMOGRAM: Primary | ICD-10-CM

## 2022-12-09 NOTE — TELEPHONE ENCOUNTER
----- Message from Ame Daugherty MD sent at 12/8/2022  7:35 PM EST -----  I think we should hold at least an aspirin until we do a repeat endoscopy      ----- Message -----  From: Ashlee Marks PA-C  Sent: 12/8/2022  12:04 PM EST  To: Ame Daugherty MD    Pt with drop in Hgb from 10-8, asymptomatic, on ASA and plavix plus iron. History of AVMs. Do you want me to have her stop ASA and plavix now?? Not scheduled for procedures until approx 2-3 months.

## 2022-12-12 DIAGNOSIS — Z87.19 HISTORY OF ANGIODYSPLASIA OF INTESTINAL TRACT: ICD-10-CM

## 2022-12-12 RX ORDER — SODIUM, POTASSIUM,MAG SULFATES 17.5-3.13G
SOLUTION, RECONSTITUTED, ORAL ORAL
Qty: 354 ML | Refills: 0 | OUTPATIENT
Start: 2022-12-12

## 2023-01-24 ENCOUNTER — TELEPHONE (OUTPATIENT)
Dept: GASTROENTEROLOGY | Facility: CLINIC | Age: 73
End: 2023-01-24
Payer: MEDICARE

## 2023-01-24 NOTE — TELEPHONE ENCOUNTER
Called the patient on 01/23/23 to see if she wanted to  her procedure sooner since we had an opening.  The patient called back on 01/24/23 and told us that with her transportation, it would not work out for her to be moved up since they have it arranged for Feb.  She elects to stay on her original date.

## 2023-02-10 NOTE — PRE-PROCEDURE INSTRUCTIONS
PAT phone history completed with pt for upcoming procedure on  2-15-23    PAT PASS GIVEN/REVIEWED WITH PT.  VERBALIZED UNDERSTANDING OF THE FOLLOWING:  DO NOT EAT, DRINK, SMOKE, USE SMOKELESS TOBACCO OR CHEW GUM AFTER MIDNIGHT THE NIGHT BEFORE SURGERY.  THIS ALSO INCLUDES HARD CANDIES AND MINTS.    DO NOT SHAVE THE AREA TO BE OPERATED ON AT LEAST 48 HOURS PRIOR TO THE PROCEDURE.  DO NOT WEAR MAKE UP OR NAIL POLISH.  DO NOT LEAVE IN ANY PIERCING OR WEAR JEWELRY THE DAY OF SURGERY.      DO NOT USE ADHESIVES IF YOU WEAR DENTURES.    DO NOT WEAR EYE CONTACTS; BRING IN YOUR GLASSES.    ONLY TAKE MEDICATION THE MORNING OF YOUR PROCEDURE IF INSTRUCTED BY YOUR SURGEON WITH ENOUGH WATER TO SWALLOW THE MEDICATION.  IF YOUR SURGEON DID NOT SPECIFY WHICH MEDICATIONS TO TAKE, YOU WILL NEED TO CALL THEIR OFFICE FOR FURTHER INSTRUCTIONS AND DO AS THEY INSTRUCT.    LEAVE ANYTHING YOU CONSIDER VALUABLE AT HOME.    YOU WILL NEED TO ARRANGE FOR SOMEONE TO DRIVE YOU HOME AFTER SURGERY.  IT IS RECOMMENDED THAT YOU DO NOT DRIVE, WORK, DRINK ALCOHOL OR MAKE MAJOR DECISIONS FOR AT LEAST 24 HOURS AFTER YOUR PROCEDURE IS COMPLETE.      THE DAY OF YOUR PROCEDURE, BRING IN THE FOLLOWING IF APPLICABLE:   PICTURE ID AND INSURANCE/MEDICARE OR MEDICAID CARDS/ANY CO-PAY THAT MAY BE DUE   COPY OF ADVANCED DIRECTIVE/LIVING WILL/POWER OR    CPAP/BIPAP/INHALERS   SKIN PREP SHEET   YOUR PREADMISSION TESTING PASS (IF NOT A PHONE HISTORY)

## 2023-02-15 ENCOUNTER — ANESTHESIA (OUTPATIENT)
Dept: GASTROENTEROLOGY | Facility: HOSPITAL | Age: 73
End: 2023-02-15
Payer: MEDICARE

## 2023-02-15 ENCOUNTER — ANESTHESIA EVENT (OUTPATIENT)
Dept: GASTROENTEROLOGY | Facility: HOSPITAL | Age: 73
End: 2023-02-15
Payer: MEDICARE

## 2023-02-15 ENCOUNTER — HOSPITAL ENCOUNTER (OUTPATIENT)
Facility: HOSPITAL | Age: 73
Setting detail: HOSPITAL OUTPATIENT SURGERY
Discharge: HOME | End: 2023-02-15
Attending: INTERNAL MEDICINE | Admitting: INTERNAL MEDICINE
Payer: MEDICARE

## 2023-02-15 VITALS
HEART RATE: 60 BPM | WEIGHT: 144 LBS | OXYGEN SATURATION: 96 % | HEIGHT: 63 IN | DIASTOLIC BLOOD PRESSURE: 82 MMHG | SYSTOLIC BLOOD PRESSURE: 143 MMHG | BODY MASS INDEX: 25.52 KG/M2 | TEMPERATURE: 97 F | RESPIRATION RATE: 17 BRPM

## 2023-02-15 DIAGNOSIS — Z87.19 HISTORY OF ANGIODYSPLASIA OF INTESTINAL TRACT: ICD-10-CM

## 2023-02-15 DIAGNOSIS — D50.0 IRON DEFICIENCY ANEMIA DUE TO CHRONIC BLOOD LOSS: ICD-10-CM

## 2023-02-15 PROCEDURE — 88300 SURGICAL PATH GROSS: CPT

## 2023-02-15 PROCEDURE — 43255 EGD CONTROL BLEEDING ANY: CPT | Performed by: INTERNAL MEDICINE

## 2023-02-15 PROCEDURE — 43239 EGD BIOPSY SINGLE/MULTIPLE: CPT | Performed by: INTERNAL MEDICINE

## 2023-02-15 PROCEDURE — 25010000002 PROPOFOL 10 MG/ML EMULSION: Performed by: NURSE ANESTHETIST, CERTIFIED REGISTERED

## 2023-02-15 PROCEDURE — 45380 COLONOSCOPY AND BIOPSY: CPT | Performed by: INTERNAL MEDICINE

## 2023-02-15 PROCEDURE — 45385 COLONOSCOPY W/LESION REMOVAL: CPT | Performed by: INTERNAL MEDICINE

## 2023-02-15 PROCEDURE — 88305 TISSUE EXAM BY PATHOLOGIST: CPT

## 2023-02-15 RX ORDER — PROPOFOL 10 MG/ML
VIAL (ML) INTRAVENOUS AS NEEDED
Status: DISCONTINUED | OUTPATIENT
Start: 2023-02-15 | End: 2023-02-15 | Stop reason: SURG

## 2023-02-15 RX ORDER — SIMETHICONE 20 MG/.3ML
EMULSION ORAL AS NEEDED
Status: DISCONTINUED | OUTPATIENT
Start: 2023-02-15 | End: 2023-02-15 | Stop reason: HOSPADM

## 2023-02-15 RX ORDER — ONDANSETRON 2 MG/ML
4 INJECTION INTRAMUSCULAR; INTRAVENOUS ONCE AS NEEDED
Status: DISCONTINUED | OUTPATIENT
Start: 2023-02-15 | End: 2023-02-15 | Stop reason: HOSPADM

## 2023-02-15 RX ORDER — SODIUM CHLORIDE 9 MG/ML
30 INJECTION, SOLUTION INTRAVENOUS CONTINUOUS PRN
Status: DISCONTINUED | OUTPATIENT
Start: 2023-02-15 | End: 2023-02-15 | Stop reason: HOSPADM

## 2023-02-15 RX ORDER — LIDOCAINE HYDROCHLORIDE 20 MG/ML
INJECTION, SOLUTION INTRAVENOUS AS NEEDED
Status: DISCONTINUED | OUTPATIENT
Start: 2023-02-15 | End: 2023-02-15 | Stop reason: SURG

## 2023-02-15 RX ADMIN — LIDOCAINE HYDROCHLORIDE 60 MG: 20 INJECTION, SOLUTION INTRAVENOUS at 09:46

## 2023-02-15 RX ADMIN — SODIUM CHLORIDE 30 ML/HR: 9 INJECTION, SOLUTION INTRAVENOUS at 09:09

## 2023-02-15 RX ADMIN — PROPOFOL 550 MG: 10 INJECTION, EMULSION INTRAVENOUS at 09:46

## 2023-02-15 NOTE — ANESTHESIA PREPROCEDURE EVALUATION
Anesthesia Evaluation     Patient summary reviewed and Nursing notes reviewed   no history of anesthetic complications:  NPO Solid Status: > 8 hours  NPO Liquid Status: > 8 hours           Airway   Mallampati: II  TM distance: >3 FB  Neck ROM: full  no difficulty expected and Possible difficult intubation  Dental - normal exam     Pulmonary - negative pulmonary ROS and normal exam   Cardiovascular - normal exam    Rhythm: regular  Rate: normal    (+) hypertension 2 medications or greater, CAD, PVD, hyperlipidemia,       Neuro/Psych  (+) CVA,    GI/Hepatic/Renal/Endo    (+)   diabetes mellitus type 2,     Musculoskeletal (-) negative ROS    Abdominal    Substance History - negative use     OB/GYN negative ob/gyn ROS         Other - negative ROS                       Anesthesia Plan    ASA 3     MAC     (Pt told that intravenous sedation will be used as the primary anesthetic along with local anesthesia if necessary. Every effort will be made to make sure the patient is comfortable.     The patient was told they may or may not have recall for the procedure. It was further explained that if the MAC was not adequate that a general anesthetic with either an LMA or endotracheal tube would be required.     Will proceed with the plan of care.)  intravenous induction     Anesthetic plan, risks, benefits, and alternatives have been provided, discussed and informed consent has been obtained with: patient.        CODE STATUS:

## 2023-02-15 NOTE — DISCHARGE INSTRUCTIONS
- Discharge patient to home (ambulatory).   - Resume previous diet.   - Hold ASA/ Plavix for 1 week   - Continue present medications.   - Await pathology results.   - Small bowel Pillcam as op to rule out small bowel AVMS   - Repeat colonoscopy in 7 years for surveillance.   - Proceed with Colonoscopy  - Return to my office in 8 weeks.     No pushing, pulling, tugging,  heavy lifting, or strenuous activity.  No major decision making, driving, or drinking alcoholic beverages for 24 hours. ( due to the medications you have  received)  Always use good hand hygiene/washing techniques.  NO driving while taking pain medications.    To assist you in voiding:  Drink plenty of fluids  Listen to running water while attempting to void.    If you are unable to urinate and you have an uncomfortable urge to void or it has been   6 hours since you were discharged, return to the Emergency Room

## 2023-02-15 NOTE — ANESTHESIA POSTPROCEDURE EVALUATION
Patient: Annalise Corona    Procedure Summary     Date: 02/15/23 Room / Location: [facility] ENDOSCOPY 2 / [facility] ENDOSCOPY    Anesthesia Start: 0941 Anesthesia Stop: 1027    Procedures:       ESOPHAGOGASTRODUODENOSCOPY WITH BIOPSY and argon beam coagulation      COLONOSCOPY with biopsy and polypectomy  (Anus) Diagnosis:       History of angiodysplasia of intestinal tract      Iron deficiency anemia due to chronic blood loss      (History of angiodysplasia of intestinal tract [Z87.19])      (Iron deficiency anemia due to chronic blood loss [D50.0])    Surgeons: Ame Daugherty MD Provider: Terry Kent CRNA    Anesthesia Type: MAC ASA Status: 3          Anesthesia Type: MAC    Vitals  Vitals Value Taken Time   /68 02/15/23 1030   Temp 97 °F (36.1 °C) 02/15/23 1030   Pulse 62 02/15/23 1030   Resp 16 02/15/23 1030   SpO2 97 % 02/15/23 1030           Post Anesthesia Care and Evaluation    Patient location during evaluation: PHASE II  Patient participation: complete - patient participated  Level of consciousness: awake  Pain score: 1  Pain management: adequate    Airway patency: patent  Anesthetic complications: No anesthetic complications  PONV Status: controlled  Cardiovascular status: acceptable and stable  Respiratory status: acceptable  Hydration status: acceptable

## 2023-02-15 NOTE — H&P
[facility]  HISTORY AND PHYSICAL    Patient Name: Annalise Corona  : 1950  MRN: [id number]    Chief Complaint:   For EGD/ colonoscopy    History Of Presenting Illness:    SARAH  Personal h/o colon polyps   AVM small and large bowel     Past Medical History:   Diagnosis Date   • Arthritis    • Body piercing     Both ears   • Borderline diabetes    • Colon polyp 10/16/2019   • Coronary artery disease 2020    Dr. Osman    • Elevated cholesterol    • History of exercise stress test    • Hyperlipidemia    • Hypertension    • PVD (peripheral vascular disease) (HCC)     12 years ago    • Stroke (HCC)     TIA   • Wears glasses        Past Surgical History:   Procedure Laterality Date   • BREAST BIOPSY Left        • CARDIOVASCULAR STRESS TEST     • COLONOSCOPY N/A 10/16/2019    Procedure: COLONOSCOPY WITH ARGON THERMAL ABLATION, COLD BIOPSY POLYPECTOMIES, AND BIOPSIES;  Surgeon: Kirit Gilmore MD;  Location: [facility] ENDOSCOPY;  Service: Gastroenterology   • CORONARY ANGIOPLASTY WITH STENT PLACEMENT      , times two stents   • ENDOSCOPY N/A 2020    Procedure: ESOPHAGOGASTRODUODENOSCOPY;  Surgeon: Kirit Gilmore MD;  Location: [facility] ENDOSCOPY;  Service: Gastroenterology   • HYSTERECTOMY     • INTERVENTIONAL RADIOLOGY PROCEDURE N/A 2019    Procedure: Abdominal Aortagram with Runoff;  Surgeon: Charan Osman MD;  Location: [facility] INVASIVE LOCATION;  Service: Cardiovascular   • OOPHORECTOMY         Social History     Socioeconomic History   • Marital status:    Tobacco Use   • Smoking status: Former     Types: Cigarettes     Quit date: 1/3/2013     Years since quitting: 10.1   • Smokeless tobacco: Never   Vaping Use   • Vaping Use: Never used   Substance and Sexual Activity   • Alcohol use: Yes     Comment: occassional   • Drug use: No   • Sexual activity: Defer       Family History   Problem Relation Age of Onset   • Hypertension Mother    • Colon cancer  Father    • Lung cancer Father    • Breast cancer Neg Hx        Prior to Admission Medications:  Medications Prior to Admission   Medication Sig Dispense Refill Last Dose   • amLODIPine (NORVASC) 10 MG tablet Take 10 mg by mouth every night at bedtime.   2/15/2023 at 0530   • atorvastatin (LIPITOR) 40 MG tablet Take 40 mg by mouth Every Night.   2/14/2023 at 2000   • calcium carbonate (OS-PASCALE) 600 MG tablet Take 600 mg by mouth 2 (Two) Times a Day With Meals.   2/8/2023   • cholecalciferol (VITAMIN D3) 400 units tablet Take 400 Units by mouth 2 (Two) Times a Day.   2/8/2023   • clopidogrel (PLAVIX) 75 MG tablet Take 75 mg by mouth Daily.   2/10/2023   • ferrous gluconate (FERGON) 324 MG tablet Take 324 mg by mouth Daily With Breakfast.   2/8/2023   • irbesartan-hydrochlorothiazide (AVALIDE) 300-12.5 MG tablet Take 1 tablet by mouth Daily.   2/15/2023 at 0530   • Multiple Vitamin (MULTIVITAMINS PO) Take 1 tablet by mouth Daily.   2/8/2023   • Omega-3 Fatty Acids (OMEGA 3 500 PO) Take 1 tablet by mouth Daily. 520 mg daily    2/8/2023   • pantoprazole (PROTONIX) 40 MG EC tablet TAKE 1 TABLET BY MOUTH 30 MINUTES BEFORE BREAKFAST DAILY. 90 tablet 1 2/12/2023   • sodium-potassium-magnesium sulfates (SUPREP) 17.5-3.13-1.6 GM/177ML solution oral solution Take 2 bottles by mouth 1 (One) Time for 1 dose. Please see prep instructions from office 354 mL 0 2/15/2023   • traZODone (DESYREL) 50 MG tablet Take 50 mg by mouth At Night As Needed.   2/14/2023 at 2000   • aspirin 81 MG EC tablet Take 81 mg by mouth Daily.   2/8/2023       Allergies:  No Known Allergies     Vitals: Temp:  [97 °F (36.1 °C)] 97 °F (36.1 °C)  Heart Rate:  [61] 61  Resp:  [16] 16  BP: (129)/(74) 129/74    Review Of Systems:  Constitutional:  Negative for chills, fever, and unexpected weight change.  Respiratory:  Negative for cough, chest tightness, shortness of breath, and wheezing.  Cardiovascular:  Negative for chest pain, palpitations, and leg  swelling.  Gastrointestinal:  Negative for abdominal distention, abdominal pain, nausea, vomiting.  Neurological:  Negative for weakness, numbness, and headaches.     Physical Exam:    General Appearance:  Alert, cooperative, in no acute distress.   Lungs:   Clear to auscultation, respirations regular, even and                 unlabored.   Heart:  Regular rhythm and normal rate.   Abdomen:   Normal bowel sounds, no masses, no organomegaly. Soft, nontender, nondistended   Neurologic: Alert and oriented x 3. Moves all four limbs equally       Plan: ESOPHAGOGASTRODUODENOSCOPY WITH BIOPSY CPT CODE: 24696 (N/A), COLONOSCOPY CPT CODE: 72282 (N/A)   SARAH  Personal h/o colon polyps   AVM small and large bowel       Ame Daugherty MD  2/15/2023

## 2023-02-16 LAB — REF LAB TEST METHOD: NORMAL

## 2023-02-17 DIAGNOSIS — K92.2 GASTROINTESTINAL HEMORRHAGE, UNSPECIFIED GASTROINTESTINAL HEMORRHAGE TYPE: ICD-10-CM

## 2023-02-17 DIAGNOSIS — D50.0 IRON DEFICIENCY ANEMIA DUE TO CHRONIC BLOOD LOSS: Primary | ICD-10-CM

## 2023-02-17 DIAGNOSIS — Z98.890 STATUS POST COLONOSCOPY: ICD-10-CM

## 2023-02-22 ENCOUNTER — TELEPHONE (OUTPATIENT)
Dept: GASTROENTEROLOGY | Facility: CLINIC | Age: 73
End: 2023-02-22
Payer: MEDICARE

## 2023-02-22 NOTE — TELEPHONE ENCOUNTER
Called patient. She has been scheduled for 03/02/23 at 8 am. Instructions given to patient by phone

## 2023-02-22 NOTE — TELEPHONE ENCOUNTER
----- Message from Shanice Perdomo sent at 2/21/2023  2:59 PM EST -----  Regarding: PILL CAM  Pill Cam - Approved - start date 02/27/2023 to 05/28/2023

## 2023-03-01 ENCOUNTER — HOSPITAL ENCOUNTER (OUTPATIENT)
Dept: MAMMOGRAPHY | Facility: HOSPITAL | Age: 73
Discharge: HOME | End: 2023-03-01
Admitting: FAMILY MEDICINE
Payer: MEDICARE

## 2023-03-01 DIAGNOSIS — Z12.31 VISIT FOR SCREENING MAMMOGRAM: ICD-10-CM

## 2023-03-01 PROCEDURE — 77067 SCR MAMMO BI INCL CAD: CPT

## 2023-03-01 PROCEDURE — 77063 BREAST TOMOSYNTHESIS BI: CPT

## 2023-03-02 ENCOUNTER — PROCEDURE VISIT (OUTPATIENT)
Dept: GASTROENTEROLOGY | Facility: CLINIC | Age: 73
End: 2023-03-02
Payer: MEDICARE

## 2023-03-02 VITALS
WEIGHT: 137.4 LBS | DIASTOLIC BLOOD PRESSURE: 78 MMHG | OXYGEN SATURATION: 96 % | BODY MASS INDEX: 24.34 KG/M2 | SYSTOLIC BLOOD PRESSURE: 116 MMHG | HEART RATE: 74 BPM

## 2023-03-02 DIAGNOSIS — D50.0 IRON DEFICIENCY ANEMIA DUE TO CHRONIC BLOOD LOSS: Primary | ICD-10-CM

## 2023-03-02 PROCEDURE — 91110 GI TRC IMG INTRAL ESOPH-ILE: CPT | Performed by: INTERNAL MEDICINE

## 2023-03-02 NOTE — PROGRESS NOTES
Patient presented to office for Pill Cam Endoscopy. Received consent. Discussed risks and benefits. Patient hooked to sensor belt monitor that was paired to capsule. Patient swallowed capsule without difficulty at 0815. Patient returned to office at 1600 to return equipment. Patient did not have any complaints of abdominal pain, nausea or vomiting. Pictures downloaded for review by Dr. Daugherty.

## 2023-03-11 DIAGNOSIS — K55.20 ANGIODYSPLASIA OF COLON: Primary | ICD-10-CM

## 2023-03-11 RX ORDER — SODIUM CHLORIDE 9 MG/ML
70 INJECTION, SOLUTION INTRAVENOUS CONTINUOUS PRN
Status: CANCELLED | OUTPATIENT
Start: 2023-03-11

## 2023-03-11 RX ORDER — BISACODYL 5 MG/1
20 TABLET, DELAYED RELEASE ORAL ONCE
Qty: 4 TABLET | Refills: 0 | Status: SHIPPED | OUTPATIENT
Start: 2023-03-11 | End: 2023-03-11

## 2023-03-14 ENCOUNTER — TELEPHONE (OUTPATIENT)
Dept: GASTROENTEROLOGY | Facility: CLINIC | Age: 73
End: 2023-03-14
Payer: MEDICARE

## 2023-03-14 DIAGNOSIS — K55.20 AVM (ARTERIOVENOUS MALFORMATION) OF COLON: ICD-10-CM

## 2023-03-14 DIAGNOSIS — D50.0 IRON DEFICIENCY ANEMIA DUE TO CHRONIC BLOOD LOSS: Primary | ICD-10-CM

## 2023-03-14 RX ORDER — BISACODYL 5 MG
TABLET, DELAYED RELEASE (ENTERIC COATED) ORAL
Qty: 4 TABLET | Refills: 0 | Status: SHIPPED | OUTPATIENT
Start: 2023-03-14 | End: 2023-03-28 | Stop reason: HOSPADM

## 2023-03-14 NOTE — TELEPHONE ENCOUNTER
----- Message from Gabrielle Herrera MA sent at 3/13/2023  8:06 AM EDT -----    ----- Message -----  From: Ame Daugherty MD  Sent: 3/11/2023  12:40 PM EDT  To: Ashlee Marks PA-C, Gabrielle Herrera MA      Her PillCam study done showed a medium sized AVM in the proximal ileum which were notable to reach with the current scope what we have.     There is also a large AVM in the colon, this can be ablated.   We will schedule her for colonoscopy and let her know that we will try to ablate if we find the AVM.  It is not always possible to locate at the AVMs with the colonic folds

## 2023-03-23 ENCOUNTER — LAB (OUTPATIENT)
Dept: LAB | Facility: HOSPITAL | Age: 73
End: 2023-03-23
Payer: MEDICARE

## 2023-03-23 ENCOUNTER — TRANSCRIBE ORDERS (OUTPATIENT)
Dept: LAB | Facility: HOSPITAL | Age: 73
End: 2023-03-23
Payer: MEDICARE

## 2023-03-23 ENCOUNTER — HOSPITAL ENCOUNTER (OUTPATIENT)
Dept: GENERAL RADIOLOGY | Facility: HOSPITAL | Age: 73
Discharge: HOME | End: 2023-03-23
Payer: MEDICARE

## 2023-03-23 DIAGNOSIS — M79.675 PAIN IN TOE OF LEFT FOOT: ICD-10-CM

## 2023-03-23 DIAGNOSIS — M79.675 PAIN IN TOE OF LEFT FOOT: Primary | ICD-10-CM

## 2023-03-23 LAB — URATE SERPL-MCNC: 4.3 MG/DL (ref 2.4–5.7)

## 2023-03-23 PROCEDURE — 36415 COLL VENOUS BLD VENIPUNCTURE: CPT

## 2023-03-23 PROCEDURE — 84550 ASSAY OF BLOOD/URIC ACID: CPT

## 2023-03-23 PROCEDURE — 73630 X-RAY EXAM OF FOOT: CPT

## 2023-03-24 NOTE — PRE-PROCEDURE INSTRUCTIONS
PAT phone history completed with pt for upcoming procedure on 3/28/23.     PAT PASS GIVEN/REVIEWED WITH PT.  VERBALIZED UNDERSTANDING OF THE FOLLOWING:  DO NOT EAT, DRINK, SMOKE, USE SMOKELESS TOBACCO OR CHEW GUM AFTER MIDNIGHT THE NIGHT BEFORE SURGERY.  THIS ALSO INCLUDES HARD CANDIES AND MINTS.    DO NOT SHAVE THE AREA TO BE OPERATED ON AT LEAST 48 HOURS PRIOR TO THE PROCEDURE.  DO NOT WEAR MAKE UP OR NAIL POLISH.  DO NOT LEAVE IN ANY PIERCING OR WEAR JEWELRY THE DAY OF SURGERY.      DO NOT USE ADHESIVES IF YOU WEAR DENTURES.    DO NOT WEAR EYE CONTACTS; BRING IN YOUR GLASSES.    ONLY TAKE MEDICATION THE MORNING OF YOUR PROCEDURE IF INSTRUCTED BY YOUR SURGEON WITH ENOUGH WATER TO SWALLOW THE MEDICATION.  IF YOUR SURGEON DID NOT SPECIFY WHICH MEDICATIONS TO TAKE, YOU WILL NEED TO CALL THEIR OFFICE FOR FURTHER INSTRUCTIONS AND DO AS THEY INSTRUCT.    LEAVE ANYTHING YOU CONSIDER VALUABLE AT HOME.    YOU WILL NEED TO ARRANGE FOR SOMEONE TO DRIVE YOU HOME AFTER SURGERY.  IT IS RECOMMENDED THAT YOU DO NOT DRIVE, WORK, DRINK ALCOHOL OR MAKE MAJOR DECISIONS FOR AT LEAST 24 HOURS AFTER YOUR PROCEDURE IS COMPLETE.      THE DAY OF YOUR PROCEDURE, BRING IN THE FOLLOWING IF APPLICABLE:   PICTURE ID AND INSURANCE/MEDICARE OR MEDICAID CARDS/ANY CO-PAY THAT MAY BE DUE   COPY OF ADVANCED DIRECTIVE/LIVING WILL/POWER OR    CPAP/BIPAP/INHALERS   SKIN PREP SHEET   YOUR PREADMISSION TESTING PASS (IF NOT A PHONE HISTORY)

## 2023-03-27 ENCOUNTER — TELEPHONE (OUTPATIENT)
Dept: GASTROENTEROLOGY | Facility: CLINIC | Age: 73
End: 2023-03-27
Payer: MEDICARE

## 2023-03-27 NOTE — TELEPHONE ENCOUNTER
The patient had questions about her Doxycycline, and taking it today and tomorrow.  Per Dr. Daugherty, taking today is okay, skip morning dose on day of procedure, take evening dose when she gets home.  Patient aware.

## 2023-03-28 ENCOUNTER — ANESTHESIA EVENT (OUTPATIENT)
Dept: GASTROENTEROLOGY | Facility: HOSPITAL | Age: 73
End: 2023-03-28
Payer: MEDICARE

## 2023-03-28 ENCOUNTER — HOSPITAL ENCOUNTER (OUTPATIENT)
Facility: HOSPITAL | Age: 73
Setting detail: HOSPITAL OUTPATIENT SURGERY
Discharge: HOME | End: 2023-03-28
Attending: INTERNAL MEDICINE | Admitting: INTERNAL MEDICINE
Payer: MEDICARE

## 2023-03-28 ENCOUNTER — ANESTHESIA (OUTPATIENT)
Dept: GASTROENTEROLOGY | Facility: HOSPITAL | Age: 73
End: 2023-03-28
Payer: MEDICARE

## 2023-03-28 VITALS
HEART RATE: 61 BPM | HEIGHT: 68 IN | BODY MASS INDEX: 21.07 KG/M2 | WEIGHT: 139 LBS | TEMPERATURE: 98.5 F | SYSTOLIC BLOOD PRESSURE: 141 MMHG | RESPIRATION RATE: 16 BRPM | OXYGEN SATURATION: 96 % | DIASTOLIC BLOOD PRESSURE: 85 MMHG

## 2023-03-28 DIAGNOSIS — K55.20 ANGIODYSPLASIA OF COLON: ICD-10-CM

## 2023-03-28 PROCEDURE — 45382 COLONOSCOPY W/CONTROL BLEED: CPT | Performed by: INTERNAL MEDICINE

## 2023-03-28 PROCEDURE — 25010000002 PROPOFOL 1000 MG/100ML EMULSION: Performed by: NURSE ANESTHETIST, CERTIFIED REGISTERED

## 2023-03-28 DEVICE — DEV CLIP ENDO RESOLUTION360 CONTRL ROT 235CM: Type: IMPLANTABLE DEVICE | Site: COLON | Status: FUNCTIONAL

## 2023-03-28 RX ORDER — LIDOCAINE HYDROCHLORIDE 20 MG/ML
INJECTION, SOLUTION INTRAVENOUS AS NEEDED
Status: DISCONTINUED | OUTPATIENT
Start: 2023-03-28 | End: 2023-03-28 | Stop reason: SURG

## 2023-03-28 RX ORDER — SIMETHICONE 20 MG/.3ML
EMULSION ORAL AS NEEDED
Status: DISCONTINUED | OUTPATIENT
Start: 2023-03-28 | End: 2023-03-28 | Stop reason: HOSPADM

## 2023-03-28 RX ORDER — PROPOFOL 10 MG/ML
INJECTION, EMULSION INTRAVENOUS AS NEEDED
Status: DISCONTINUED | OUTPATIENT
Start: 2023-03-28 | End: 2023-03-28 | Stop reason: SURG

## 2023-03-28 RX ORDER — SODIUM CHLORIDE 9 MG/ML
70 INJECTION, SOLUTION INTRAVENOUS CONTINUOUS PRN
Status: DISCONTINUED | OUTPATIENT
Start: 2023-03-28 | End: 2023-03-28 | Stop reason: HOSPADM

## 2023-03-28 RX ADMIN — LIDOCAINE HYDROCHLORIDE 60 MG: 20 INJECTION, SOLUTION INTRAVENOUS at 12:33

## 2023-03-28 RX ADMIN — PROPOFOL 50 MG: 10 INJECTION, EMULSION INTRAVENOUS at 12:40

## 2023-03-28 RX ADMIN — PROPOFOL 50 MG: 10 INJECTION, EMULSION INTRAVENOUS at 12:33

## 2023-03-28 RX ADMIN — PROPOFOL 100 MG: 10 INJECTION, EMULSION INTRAVENOUS at 12:45

## 2023-03-28 RX ADMIN — PROPOFOL 50 MG: 10 INJECTION, EMULSION INTRAVENOUS at 13:00

## 2023-03-28 RX ADMIN — PROPOFOL 100 MG: 10 INJECTION, EMULSION INTRAVENOUS at 13:10

## 2023-03-28 RX ADMIN — PROPOFOL 50 MG: 10 INJECTION, EMULSION INTRAVENOUS at 12:50

## 2023-03-28 RX ADMIN — SODIUM CHLORIDE 70 ML/HR: 9 INJECTION, SOLUTION INTRAVENOUS at 12:24

## 2023-03-28 RX ADMIN — PROPOFOL 50 MG: 10 INJECTION, EMULSION INTRAVENOUS at 13:06

## 2023-03-28 RX ADMIN — PROPOFOL 50 MG: 10 INJECTION, EMULSION INTRAVENOUS at 12:55

## 2023-03-28 NOTE — ANESTHESIA PREPROCEDURE EVALUATION
Anesthesia Evaluation     Patient summary reviewed and Nursing notes reviewed   no history of anesthetic complications:  NPO Solid Status: > 8 hours  NPO Liquid Status: > 8 hours           Airway   Mallampati: II  TM distance: >3 FB  Neck ROM: full  Possible difficult intubation  Dental - normal exam     Pulmonary - normal exam   (+) a smoker Former,   Cardiovascular - normal exam    PT is on anticoagulation therapy  Rhythm: regular  Rate: normal    (+) hypertension 2 medications or greater, CAD, PVD, hyperlipidemia,       Neuro/Psych  (+) CVA,    GI/Hepatic/Renal/Endo    (+)   diabetes mellitus type 2,     Musculoskeletal     Abdominal    Substance History   (+) alcohol use,      OB/GYN negative ob/gyn ROS         Other   arthritis, blood dyscrasia anemia,     ROS/Med Hx Other: EF 55%                    Anesthesia Plan    ASA 3     MAC     (Pt told that intravenous sedation will be used as the primary anesthetic along with local anesthesia if necessary. Every effort will be made to make sure the patient is comfortable.     The patient was told they may or may not have recall for the procedure. It was further explained that if the MAC was not adequate that a general anesthetic with either an LMA or endotracheal tube would be required.     Will proceed with the plan of care.)  intravenous induction     Anesthetic plan, risks, benefits, and alternatives have been provided, discussed and informed consent has been obtained with: patient.  Pre-procedure education provided  Plan discussed with CRNA.        CODE STATUS:

## 2023-03-28 NOTE — H&P
[facility]  HISTORY AND PHYSICAL    Patient Name: Annalise Corona  : 1950  MRN: [id number]    Chief Complaint:   For colonoscopy    History Of Presenting Illness:    GI Bleeding '  Colon AVM   Abnormal Pillcam     Past Medical History:   Diagnosis Date   • Arthritis    • Body piercing     Both ears   • Borderline diabetes    • Colon polyp 10/16/2019   • Coronary artery disease 2020    Dr. Osman    • Elevated cholesterol    • History of exercise stress test    • Hyperlipidemia    • Hypertension    • PVD (peripheral vascular disease) (HCC)     12 years ago    • Stroke (HCC)     TIA - pt states greater than 10 years ago   • Wears glasses        Past Surgical History:   Procedure Laterality Date   • BREAST BIOPSY Left        • CARDIOVASCULAR STRESS TEST     • COLONOSCOPY N/A 10/16/2019    Procedure: COLONOSCOPY WITH ARGON THERMAL ABLATION, COLD BIOPSY POLYPECTOMIES, AND BIOPSIES;  Surgeon: Kirit Gilmore MD;  Location: [facility] ENDOSCOPY;  Service: Gastroenterology   • COLONOSCOPY N/A 02/15/2023    Procedure: COLONOSCOPY with biopsy and polypectomy ;  Surgeon: Ame Daugherty MD;  Location: [facility] ENDOSCOPY;  Service: Gastroenterology;  Laterality: N/A;   • CORONARY ANGIOPLASTY WITH STENT PLACEMENT      , times two stents   • ENDOSCOPY N/A 2020    Procedure: ESOPHAGOGASTRODUODENOSCOPY;  Surgeon: Kirit Gilmore MD;  Location: [facility] ENDOSCOPY;  Service: Gastroenterology   • ENDOSCOPY N/A 02/15/2023    Procedure: ESOPHAGOGASTRODUODENOSCOPY WITH BIOPSY and argon beam coagulation;  Surgeon: Ame Daugherty MD;  Location: [facility] ENDOSCOPY;  Service: Gastroenterology;  Laterality: N/A;   • HYSTERECTOMY     • INTERVENTIONAL RADIOLOGY PROCEDURE N/A 2019    Procedure: Abdominal Aortagram with Runoff;  Surgeon: Charan Osman MD;  Location: [facility] CATH INVASIVE LOCATION;  Service: Cardiovascular   • OOPHORECTOMY         Social History     Socioeconomic  History   • Marital status:    Tobacco Use   • Smoking status: Former     Types: Cigarettes     Quit date: 1/3/2013     Years since quitting: 10.2   • Smokeless tobacco: Never   Vaping Use   • Vaping Use: Never used   Substance and Sexual Activity   • Alcohol use: Yes     Comment: occassional   • Drug use: No   • Sexual activity: Defer       Family History   Problem Relation Age of Onset   • Hypertension Mother    • Colon cancer Father    • Lung cancer Father    • Breast cancer Neg Hx        Prior to Admission Medications:  Medications Prior to Admission   Medication Sig Dispense Refill Last Dose   • amLODIPine (NORVASC) 10 MG tablet Take 1 tablet by mouth every night at bedtime.   3/27/2023   • aspirin 81 MG EC tablet Take 1 tablet by mouth Daily.   3/21/2023   • calcium carbonate (OS-PASCALE) 600 MG tablet Take 1 tablet by mouth 2 (Two) Times a Day With Meals.   3/21/2023   • DOXYCYCLINE HYCLATE PO Take 100 mg by mouth 2 (Two) Times a Day.   3/27/2023   • irbesartan-hydrochlorothiazide (AVALIDE) 300-12.5 MG tablet Take 1 tablet by mouth Daily.   3/28/2023   • Multiple Vitamin (MULTIVITAMINS PO) Take 1 tablet by mouth Daily.   Past Week   • Omega-3 Fatty Acids (OMEGA 3 500 PO) Take 1 tablet by mouth Daily. 520 mg daily    Past Week   • pantoprazole (PROTONIX) 40 MG EC tablet TAKE 1 TABLET BY MOUTH 30 MINUTES BEFORE BREAKFAST DAILY. 90 tablet 1 Past Week   • traZODone (DESYREL) 50 MG tablet Take 1 tablet by mouth At Night As Needed.   Past Week   • atorvastatin (LIPITOR) 40 MG tablet Take 1 tablet by mouth Every Night.   Unknown   • bisacodyl (Dulcolax) 5 MG EC tablet Follow instructions given at office 4 tablet 0    • cholecalciferol (VITAMIN D3) 400 units tablet Take 1 tablet by mouth 2 (Two) Times a Day.   3/21/2023   • clopidogrel (PLAVIX) 75 MG tablet Take 1 tablet by mouth Daily.   3/22/2023 at 0800   • ferrous gluconate (FERGON) 324 MG tablet Take 1 tablet by mouth Daily With Breakfast.   3/21/2023   •  polyethylene glycol (GoLYTELY) 236 g solution Follow instructions given at office 4000 mL 0        Allergies:  No Known Allergies     Vitals: Temp:  [97.6 °F (36.4 °C)] 97.6 °F (36.4 °C)  Heart Rate:  [70] 70  Resp:  [16] 16  BP: (154)/(73) 154/73    Review Of Systems:  Constitutional:  Negative for chills, fever, and unexpected weight change.  Respiratory:  Negative for cough, chest tightness, shortness of breath, and wheezing.  Cardiovascular:  Negative for chest pain, palpitations, and leg swelling.  Gastrointestinal:  Negative for abdominal distention, abdominal pain, nausea, vomiting.  Neurological:  Negative for weakness, numbness, and headaches.     Physical Exam:    General Appearance:  Alert, cooperative, in no acute distress.   Lungs:   Clear to auscultation, respirations regular, even and                 unlabored.   Heart:  Regular rhythm and normal rate.   Abdomen:   Normal bowel sounds, no masses, no organomegaly. Soft, nontender, nondistended   Neurologic: Alert and oriented x 3. Moves all four limbs equally       Assessment & Plan     Assessment:  Principal Problem:    Angiodysplasia of colon      Plan: COLONOSCOPY (N/A)     Ame Daugherty MD  3/28/2023

## 2023-03-28 NOTE — DISCHARGE INSTRUCTIONS
- Discharge patient to home (ambulatory).   - Resume previous diet.   - Hold ASA/plavix for 5 days   - Continue present medications.   - Await pathology results.   - Return to my office in 8 weeks.    No pushing, pulling, tugging,  heavy lifting, or strenuous activity.  No major decision making, driving, or drinking alcoholic beverages for 24 hours. ( due to the medications you have  received)  Always use good hand hygiene/washing techniques.  NO driving while taking pain medications.    To assist you in voiding:  Drink plenty of fluids  Listen to running water while attempting to void.    If you are unable to urinate and you have an uncomfortable urge to void or it has been   6 hours since you were discharged, return to the Emergency Room

## 2023-03-28 NOTE — ANESTHESIA POSTPROCEDURE EVALUATION
Patient: Annalise Corona    Procedure Summary     Date: 03/28/23 Room / Location: [facility] ENDOSCOPY 2 / [facility] ENDOSCOPY    Anesthesia Start: 1234 Anesthesia Stop: 1321    Procedure: COLONOSCOPY WITH AVM COAGULATION OF ASCENDING COLON AND HEPATIC FLEXURE AND RESOLUTION CLIP PLACEMENT X1 (Anus) Diagnosis:       Angiodysplasia of colon      (Angiodysplasia of colon [K55.20])    Surgeons: Ame Daugherty MD Provider: Miguel Cook CRNA    Anesthesia Type: MAC ASA Status: 3          Anesthesia Type: MAC    Vitals  No vitals data found for the desired time range.          Post Anesthesia Care and Evaluation    Patient location during evaluation: bedside  Patient participation: complete - patient participated  Level of consciousness: awake  Pain score: 0  Pain management: adequate    Airway patency: patent  Anesthetic complications: No anesthetic complications  PONV Status: controlled  Cardiovascular status: acceptable and stable  Respiratory status: acceptable and room air  Hydration status: acceptable    Comments: Vital signs as noted in nursing documentation as per protocol

## 2023-04-27 ENCOUNTER — LAB (OUTPATIENT)
Dept: LAB | Facility: HOSPITAL | Age: 73
End: 2023-04-27
Payer: MEDICARE

## 2023-04-27 ENCOUNTER — OFFICE VISIT (OUTPATIENT)
Dept: GASTROENTEROLOGY | Facility: CLINIC | Age: 73
End: 2023-04-27
Payer: MEDICARE

## 2023-04-27 VITALS
OXYGEN SATURATION: 99 % | HEART RATE: 57 BPM | WEIGHT: 143 LBS | TEMPERATURE: 97 F | HEIGHT: 68 IN | DIASTOLIC BLOOD PRESSURE: 76 MMHG | SYSTOLIC BLOOD PRESSURE: 138 MMHG | BODY MASS INDEX: 21.67 KG/M2

## 2023-04-27 DIAGNOSIS — Z87.19 HISTORY OF ANGIODYSPLASIA OF INTESTINAL TRACT: ICD-10-CM

## 2023-04-27 DIAGNOSIS — K55.20 AVM (ARTERIOVENOUS MALFORMATION) OF COLON: ICD-10-CM

## 2023-04-27 DIAGNOSIS — Z86.010 HISTORY OF COLON POLYPS: ICD-10-CM

## 2023-04-27 DIAGNOSIS — D50.0 IRON DEFICIENCY ANEMIA DUE TO CHRONIC BLOOD LOSS: Primary | ICD-10-CM

## 2023-04-27 DIAGNOSIS — D50.0 IRON DEFICIENCY ANEMIA DUE TO CHRONIC BLOOD LOSS: ICD-10-CM

## 2023-04-27 DIAGNOSIS — K55.20 ANGIODYSPLASIA OF SMALL INTESTINE: ICD-10-CM

## 2023-04-27 LAB
DEPRECATED RDW RBC AUTO: 43.3 FL (ref 37–54)
ERYTHROCYTE [DISTWIDTH] IN BLOOD BY AUTOMATED COUNT: 14.6 % (ref 12.3–15.4)
FERRITIN SERPL-MCNC: 29.7 NG/ML (ref 13–150)
HCT VFR BLD AUTO: 32.3 % (ref 34–46.6)
HGB BLD-MCNC: 10.7 G/DL (ref 12–15.9)
IRON 24H UR-MRATE: 39 MCG/DL (ref 37–145)
IRON SATN MFR SERPL: 9 % (ref 20–50)
MCH RBC QN AUTO: 27.3 PG (ref 26.6–33)
MCHC RBC AUTO-ENTMCNC: 33.1 G/DL (ref 31.5–35.7)
MCV RBC AUTO: 82.4 FL (ref 79–97)
PLATELET # BLD AUTO: 355 10*3/MM3 (ref 140–450)
PMV BLD AUTO: 10.8 FL (ref 6–12)
RBC # BLD AUTO: 3.92 10*6/MM3 (ref 3.77–5.28)
TIBC SERPL-MCNC: 446 MCG/DL (ref 298–536)
TRANSFERRIN SERPL-MCNC: 299 MG/DL (ref 200–360)
WBC NRBC COR # BLD: 5.04 10*3/MM3 (ref 3.4–10.8)

## 2023-04-27 PROCEDURE — 82728 ASSAY OF FERRITIN: CPT

## 2023-04-27 PROCEDURE — 99214 OFFICE O/P EST MOD 30 MIN: CPT | Performed by: PHYSICIAN ASSISTANT

## 2023-04-27 PROCEDURE — 83540 ASSAY OF IRON: CPT

## 2023-04-27 PROCEDURE — 36415 COLL VENOUS BLD VENIPUNCTURE: CPT

## 2023-04-27 PROCEDURE — 1160F RVW MEDS BY RX/DR IN RCRD: CPT | Performed by: PHYSICIAN ASSISTANT

## 2023-04-27 PROCEDURE — 3078F DIAST BP <80 MM HG: CPT | Performed by: PHYSICIAN ASSISTANT

## 2023-04-27 PROCEDURE — 84466 ASSAY OF TRANSFERRIN: CPT

## 2023-04-27 PROCEDURE — 85027 COMPLETE CBC AUTOMATED: CPT

## 2023-04-27 PROCEDURE — 3075F SYST BP GE 130 - 139MM HG: CPT | Performed by: PHYSICIAN ASSISTANT

## 2023-04-27 PROCEDURE — 1159F MED LIST DOCD IN RCRD: CPT | Performed by: PHYSICIAN ASSISTANT

## 2023-04-27 NOTE — PROGRESS NOTES
Follow Up Note     Date: 2023   Patient Name: Annalise Corona  MRN: [id number]  : 1950     Primary Care Provider: Lisa Martinez MD     Chief Complaint   Patient presents with   • Results     Colonoscopy and Upper GI Endoscopy     History of present illness:   2023  Annalise Corona is a 72 y.o. female who is here today for follow up regarding Results (PillCam, EGD and Colonoscopy x2) and anemia.     She has not had any black stools or rectal bleeding. Feeling well today. She is still taking oral iron therapy daily. Had repeat colonoscopy as directed and clip placed on AVM. Her Hgb has not been checked in months per her report. Would like to discuss recent Pillcam, EGD, colonoscopy and repeat colonoscopy.     Interval History:  2019 Dr. Gilmore  For colon cancer screening.  The patient denies recent change in bowel habits.  There is no diarrhea or constipation.  There is no history of abdominal pain.  There is no history of overt GI bleed (hematemesis melena or hematochezia).  The patient denies nausea or vomiting.  There is no history of reflux.  The patient denies dysphagia or odynophagia.  There is no history of recent significant weight loss.  There is no history of liver or pancreatic disease.     The patient had undergone a colonoscopy about 5 years ago in [facility].  There is history of colon polyps.  Unfortunately, the details are not known.  The patient was recommended to have a follow-up colonoscopy in 5 years.  Recently, in May 2019 the patient was noted to be mildly anemic with hemoglobin of 11.6.  There is no history of gross hematuria, nosebleeds or vaginal bleeds.  There is no associated weakness, dizziness, chest pains or excessive shortness of breath.     The patient has history of peripheral vascular disease as well as coronary artery disease for which she had stents placed in the past.  Currently the patient is on aspirin and Plavix.    Subjective     Past Medical  History:   Diagnosis Date   • Arthritis    • Body piercing     Both ears   • Borderline diabetes    • Colon polyp 10/16/2019   • Coronary artery disease 01/02/2020    Dr. Osman    • Elevated cholesterol    • History of exercise stress test    • Hyperlipidemia    • Hypertension    • PVD (peripheral vascular disease)     12 years ago    • Stroke     TIA - pt states greater than 10 years ago   • Wears glasses      Past Surgical History:   Procedure Laterality Date   • BREAST BIOPSY Left     2001   • CARDIOVASCULAR STRESS TEST     • COLONOSCOPY N/A 10/16/2019    Procedure: COLONOSCOPY WITH ARGON THERMAL ABLATION, COLD BIOPSY POLYPECTOMIES, AND BIOPSIES;  Surgeon: Kirit Gilmore MD;  Location: [facility];  Service: Gastroenterology   • COLONOSCOPY N/A 02/15/2023    Procedure: COLONOSCOPY with biopsy and polypectomy ;  Surgeon: Ame Daugherty MD;  Location: [facility] ENDOSCOPY;  Service: Gastroenterology;  Laterality: N/A;   • COLONOSCOPY N/A 3/28/2023    Procedure: COLONOSCOPY WITH AVM COAGULATION OF ASCENDING COLON AND HEPATIC FLEXURE AND RESOLUTION CLIP PLACEMENT X1;  Surgeon: Ame Daugherty MD;  Location: [facility] ENDOSCOPY;  Service: Gastroenterology;  Laterality: N/A;   • CORONARY ANGIOPLASTY WITH STENT PLACEMENT      2007, times two stents   • ENDOSCOPY N/A 01/07/2020    Procedure: ESOPHAGOGASTRODUODENOSCOPY;  Surgeon: Kirit Gilmore MD;  Location: [facility] END[facility];  Service: Gastroenterology   • ENDOSCOPY N/A 02/15/2023    Procedure: ESOPHAGOGASTRODUODENOSCOPY WITH BIOPSY and argon beam coagulation;  Surgeon: Ame Daugherty MD;  Location: [facility];  Service: Gastroenterology;  Laterality: N/A;   • HYSTERECTOMY     • INTERVENTIONAL RADIOLOGY PROCEDURE N/A 01/03/2019    Procedure: Abdominal Aortagram with Runoff;  Surgeon: Charan Osman MD;  [facility];  Service: Cardiovascular   • OOPHORECTOMY       Family History   Problem Relation Age of  Onset   • Hypertension Mother    • Colon cancer Father    • Lung cancer Father    • Breast cancer Neg Hx      Social History     Socioeconomic History   • Marital status:    Tobacco Use   • Smoking status: Former     Types: Cigarettes     Quit date: 1/3/2013     Years since quitting: 10.3   • Smokeless tobacco: Never   Vaping Use   • Vaping Use: Never used   Substance and Sexual Activity   • Alcohol use: Yes     Comment: occassional   • Drug use: No   • Sexual activity: Defer     Current Outpatient Medications:   •  amLODIPine (NORVASC) 10 MG tablet, Take 1 tablet by mouth every night at bedtime., Disp: , Rfl:   •  aspirin 81 MG EC tablet, Take 1 tablet by mouth Daily., Disp: , Rfl:   •  atorvastatin (LIPITOR) 40 MG tablet, Take 1 tablet by mouth Every Night., Disp: , Rfl:   •  calcium carbonate (OS-PASCALE) 600 MG tablet, Take 1 tablet by mouth 2 (Two) Times a Day With Meals., Disp: , Rfl:   •  cholecalciferol (VITAMIN D3) 400 units tablet, Take 1 tablet by mouth 2 (Two) Times a Day., Disp: , Rfl:   •  clopidogrel (PLAVIX) 75 MG tablet, Take 1 tablet by mouth Daily., Disp: , Rfl:   •  DOXYCYCLINE HYCLATE PO, Take 100 mg by mouth 2 (Two) Times a Day., Disp: , Rfl:   •  ferrous gluconate (FERGON) 324 MG tablet, Take 1 tablet by mouth Daily With Breakfast., Disp: , Rfl:   •  irbesartan-hydrochlorothiazide (AVALIDE) 300-12.5 MG tablet, Take 1 tablet by mouth Daily., Disp: , Rfl:   •  Multiple Vitamin (MULTIVITAMINS PO), Take 1 tablet by mouth Daily., Disp: , Rfl:   •  Omega-3 Fatty Acids (OMEGA 3 500 PO), Take 1 tablet by mouth Daily. 520 mg daily , Disp: , Rfl:   •  pantoprazole (PROTONIX) 40 MG EC tablet, TAKE 1 TABLET BY MOUTH 30 MINUTES BEFORE BREAKFAST DAILY., Disp: 90 tablet, Rfl: 1  •  traZODone (DESYREL) 50 MG tablet, Take 1 tablet by mouth At Night As Needed., Disp: , Rfl:     No Known Allergies    The following portions of the patient's history were reviewed and updated as appropriate: allergies, current  "medications, past family history, past medical history, past social history, past surgical history and problem list.    Objective     Physical Exam  Constitutional:       General: She is not in acute distress.     Appearance: Normal appearance. She is well-developed. She is not diaphoretic.      Comments: pleasant   HENT:      Head: Normocephalic and atraumatic.      Right Ear: External ear normal.      Left Ear: External ear normal.      Nose: Nose normal.   Eyes:      General: No scleral icterus.        Right eye: No discharge.         Left eye: No discharge.      Conjunctiva/sclera: Conjunctivae normal.   Neck:      Trachea: No tracheal deviation.   Pulmonary:      Effort: Pulmonary effort is normal. No respiratory distress.   Musculoskeletal:         General: Normal range of motion.      Cervical back: Normal range of motion.   Skin:     Coloration: Skin is not pale.      Findings: No erythema or rash.   Neurological:      Mental Status: She is alert and oriented to person, place, and time.      Coordination: Coordination normal.   Psychiatric:         Mood and Affect: Mood normal.         Behavior: Behavior normal.         Thought Content: Thought content normal.         Judgment: Judgment normal.       Vitals:    04/27/23 1304   BP: 138/76   BP Location: Left arm   Patient Position: Sitting   Cuff Size: Adult   Pulse: 57   Temp: 97 °F (36.1 °C)   TempSrc: Temporal   SpO2: 99%   Weight: 64.9 kg (143 lb)   Height: 172.7 cm (68\")     Results Review:   I reviewed the patient's new clinical results.    Dated April 21, 2014 the patient underwent an upper endoscopy which revealed: Report unavailable.  Gastric biopsies revealed reactive gastropathy with focal surface erosion; special stain negative for Helicobacter.  Duodenal biopsies revealed no significant pathologic abnormalities.     Colonoscopy dated 10/16/2019 reveals scant pandiverticulosis.  Internal hemorrhoids.  Colon polyps.  2 were removed, 3 mm in size. "  Angiodysplasia in the right colon.  One with a minimal ooze of blood.  Status post thermal ablation therapy using argon plasma coagulator.  Terminal ileum biopsy reveals unremarkable small bowel mucosa.  Random colon biopsy reveals unremarkable colonic mucosa.  Negative for acute and microscopic colitis.  Cecum polyp biopsy reveals unremarkable colonic mucosa.  Rectal polyp biopsy reveals hyperplastic polyp.  Sigmoid colon polyp biopsy reveals lymphoid aggregate.     EGD dated 1/7/2020 reveals erythematous distal esophagitis.  Nonobstructive Schatzki's type ring.  Small sliding hiatal hernia less than 3 cm.  Erythematous erosive gastritis.  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  Status post thermal ablation therapy using argon plasma coagulator.  In the presence of coagulopathy small nonbleeding vascular ectasias can explain anemia.  Second portion of duodenum biopsy reveals small bowel mucosa with no significant pathologic abnormality.  Antrum body and fundus biopsy reveals gastric mucosa with mild reactive gastropathy.  No H. pylori, intestinal metaplasia or dysplasia.     Labs 11/8/2022: Cr 0.86, AST 28, ALT 17, alk phos 71, bili 0.3, Hgb 8.4, HCT 28.1, MCV 76, platelets 381,000, total cholesterol 212, TG 59, LDL 97, ferritin 9, vit B12 1497, folate >20,  iron 23, TIBC 457, iron saturation 5%.     EGD and colonoscopy by Dr. Daugherty 2/15/2023  - Normal oropharynx.  - Z-line regular, 40 cm from the incisors.  - No gross lesions in the entire esophagus.  - A few gastric polyps. FGPs, previously biopsied  - Erythematous mucosa in the antrum and prepyloric region of the stomach. Biopsied.  - Three non-bleeding angioectasias in the duodenum. Treated with argon plasma coagulation (APC).  - Normal third portion of the duodenum.  - Decreased sphincter tone found on digital rectal exam.  - Diverticulosis in the sigmoid colon, in the descending colon, in the ascending colon and in the cecum.  - Tortuous  colon.  - One 2 to 3 mm polyp in the proximal ascending colon, removed with a cold snare. Resected and retrieved.  - One 2 to 3 mm polyp in the rectum, removed with a cold snare. Resected and retrieved.  - The examined portion of the ileum was normal. Biopsied for anemia  - No lower GI AVMs identified  Pathology DIAGNOSIS:   A.     GASTRIC BIOPSY:   Gastric antral type mucosa with reactive (chemical) gastropathy   Negative H. pylori, metaplasia or dysplasia   B.     TERMINAL ILEUM BIOPSY:   Intestinal mucosa without pathologic alterations   C.     ASCENDING COLON POLYP:   Colonic type mucosa with no significant histopathologic abnormalities   Negative for significant inflammation or atypia   D.     RECTOSIGMOID COLON, POLYP:   Fecal material only   No colon mucosa present    Pillcam 3/2/2023  Single medium sized nonbleeding AVM noted possibly in the proximal ileum at 1 hour 15 minutes 59seconds. No proximal small bowel AVMs identified.  Apart from that there was a large nonbleeding AVM in the colon which appears to be located on the left side colon.    Repeat colonoscopy by Dr. Daugherty 3/28/2023  - Diverticulosis in the sigmoid colon and in the proximal ascending colon.  - A single non-bleeding colonic angioectasia. Treated with argon beam coagulation.  - A single non-bleeding colonic angioectasia. Treatment not successful. Treated with argon plasma coagulation (APC). Clip (MR conditional) was placed. Clip : [employer].  - The examined portion of the ileum was normal.  No specimens collected.    Assessment / Plan      1. Iron deficiency anemia due to chronic blood loss  2. AVM (arteriovenous malformation) of colon  3. Angiodysplasia of small intestine  4. History of colon polyps  In Dec 2022, her Hgb dropped from 10 to 8 g/dL. She had EGD, colonoscopy then pillcam and a repeat colonoscopy since last visit.     EGD and colonoscopy 2/2023 showed 2 AVMs in the duodenum, none in the colon. PillCam  showed right colon AVM and proximal ileum AVM. Repeat colonoscopy, had clip placement at single right colonic AVM. Previous EGD in 2020 she had vascular ectasia in stomach and duodenum. Previous colonoscopy in 2019, she had angiodysplasia in the right colon. Has a personal history of colon polyps.      No recent labs to review. She had not seen any black stools or bright red blood per rectum. No increased fatigue or other noticeable changes since her last procedures. Taking daily iron supplement as directed. She has not had any recent abdominal imaging. Labs 11/2022 showed Hgb at 8.4.    Labs now  Continue iron supplement PO once daily  Of note, still has proximal ileum AVM (not treated)  Report to ER with any severe symptoms including SOA, weakness, dizziness, black tarry stools or rectal bleeding  Will consider CTAP depending on lab results  Keep follow up with PCP     - CBC (No Diff); Future  - Ferritin; Future  - Iron Profile; Future      Follow Up:   Return in about 6 months (around 10/27/2023) for recheck anemia and GI bleeding with Dr. Daugherty.     Ashlee Marks PA-C  [facility] [place]  5/9/2023  08:49 EDT    Dictated Utilizing Dragon Dictation: Part of this note may be an electronic transcription/translation of spoken language to printed text using the Dragon Dictation System.

## 2023-04-27 NOTE — LETTER
May 9, 2023     Lisa Martinez MD  [address]  [place] [address]    Patient: Annalise Corona   YOB: 1950   Date of Visit: 2023       Dear Lisa Martinez MD    Annalise Corona was in my office today. Below is a copy of my note.    If you have questions, please do not hesitate to call me. I look forward to following Annalise along with you.         Sincerely,        Ashlee Marks PA-C        CC: No Recipients         Follow Up Note     Date: 2023   Patient Name: Annalise Corona  MRN: [id number]  : 1950     Primary Care Provider: Lisa Martinez MD     Chief Complaint   Patient presents with   • Results     Colonoscopy and Upper GI Endoscopy     History of present illness:   2023  Annalise Corona is a 72 y.o. female who is here today for follow up regarding Results (PillCam, EGD and Colonoscopy x2) and anemia.     She has not had any black stools or rectal bleeding. Feeling well today. She is still taking oral iron therapy daily. Had repeat colonoscopy as directed and clip placed on AVM. Her Hgb has not been checked in months per her report. Would like to discuss recent Pillcam, EGD, colonoscopy and repeat colonoscopy.     Interval History:  2019 Dr. Gilmore  For colon cancer screening.  The patient denies recent change in bowel habits.  There is no diarrhea or constipation.  There is no history of abdominal pain.  There is no history of overt GI bleed (hematemesis melena or hematochezia).  The patient denies nausea or vomiting.  There is no history of reflux.  The patient denies dysphagia or odynophagia.  There is no history of recent significant weight loss.  There is no history of liver or pancreatic disease.     The patient had undergone a colonoscopy about 5 years ago in [facility].  There is history of colon polyps.  Unfortunately, the details are not known.  The patient was recommended to have a follow-up colonoscopy in 5 years.  Recently, in May  2019 the patient was noted to be mildly anemic with hemoglobin of 11.6.  There is no history of gross hematuria, nosebleeds or vaginal bleeds.  There is no associated weakness, dizziness, chest pains or excessive shortness of breath.     The patient has history of peripheral vascular disease as well as coronary artery disease for which she had stents placed in the past.  Currently the patient is on aspirin and Plavix.    Subjective     Past Medical History:   Diagnosis Date   • Arthritis    • Body piercing     Both ears   • Borderline diabetes    • Colon polyp 10/16/2019   • Coronary artery disease 01/02/2020    Dr. Osman    • Elevated cholesterol    • History of exercise stress test    • Hyperlipidemia    • Hypertension    • PVD (peripheral vascular disease)     12 years ago    • Stroke     TIA - pt states greater than 10 years ago   • Wears glasses      Past Surgical History:   Procedure Laterality Date   • BREAST BIOPSY Left     2001   • CARDIOVASCULAR STRESS TEST     • COLONOSCOPY N/A 10/16/2019    Procedure: COLONOSCOPY WITH ARGON THERMAL ABLATION, COLD BIOPSY POLYPECTOMIES, AND BIOPSIES;  Surgeon: Kirit Gilmore MD;  Location: [facility] ENDOSCOPY;  Service: Gastroenterology   • COLONOSCOPY N/A 02/15/2023    Procedure: COLONOSCOPY with biopsy and polypectomy ;  Surgeon: Ame Daugherty MD;  Location: [facility] ENDOSCOPY;  Service: Gastroenterology;  Laterality: N/A;   • COLONOSCOPY N/A 3/28/2023    Procedure: COLONOSCOPY WITH AVM COAGULATION OF ASCENDING COLON AND HEPATIC FLEXURE AND RESOLUTION CLIP PLACEMENT X1;  Surgeon: Ame Daugherty MD;  Location: [facility] ENDOSCOPY;  Service: Gastroenterology;  Laterality: N/A;   • CORONARY ANGIOPLASTY WITH STENT PLACEMENT      2007, times two stents   • ENDOSCOPY N/A 01/07/2020    Procedure: ESOPHAGOGASTRODUODENOSCOPY;  Surgeon: Kirit Gilmore MD;  Location: [facility] ENDOSCOPY;  Service: Gastroenterology   • ENDOSCOPY N/A 02/15/2023    Procedure:  ESOPHAGOGASTRODUODENOSCOPY WITH BIOPSY and argon beam coagulation;  Surgeon: Ame Daugherty MD;  Location: [facility] ENDOSCOPY;  Service: Gastroenterology;  Laterality: N/A;   • HYSTERECTOMY     • INTERVENTIONAL RADIOLOGY PROCEDURE N/A 01/03/2019    Procedure: Abdominal Aortagram with Runoff;  Surgeon: Charan Osman MD;  Location: [facility] CATH INVASIVE LOCATION;  Service: Cardiovascular   • OOPHORECTOMY       Family History   Problem Relation Age of Onset   • Hypertension Mother    • Colon cancer Father    • Lung cancer Father    • Breast cancer Neg Hx      Social History     Socioeconomic History   • Marital status:    Tobacco Use   • Smoking status: Former     Types: Cigarettes     Quit date: 1/3/2013     Years since quitting: 10.3   • Smokeless tobacco: Never   Vaping Use   • Vaping Use: Never used   Substance and Sexual Activity   • Alcohol use: Yes     Comment: occassional   • Drug use: No   • Sexual activity: Defer     Current Outpatient Medications:   •  amLODIPine (NORVASC) 10 MG tablet, Take 1 tablet by mouth every night at bedtime., Disp: , Rfl:   •  aspirin 81 MG EC tablet, Take 1 tablet by mouth Daily., Disp: , Rfl:   •  atorvastatin (LIPITOR) 40 MG tablet, Take 1 tablet by mouth Every Night., Disp: , Rfl:   •  calcium carbonate (OS-PASCALE) 600 MG tablet, Take 1 tablet by mouth 2 (Two) Times a Day With Meals., Disp: , Rfl:   •  cholecalciferol (VITAMIN D3) 400 units tablet, Take 1 tablet by mouth 2 (Two) Times a Day., Disp: , Rfl:   •  clopidogrel (PLAVIX) 75 MG tablet, Take 1 tablet by mouth Daily., Disp: , Rfl:   •  DOXYCYCLINE HYCLATE PO, Take 100 mg by mouth 2 (Two) Times a Day., Disp: , Rfl:   •  ferrous gluconate (FERGON) 324 MG tablet, Take 1 tablet by mouth Daily With Breakfast., Disp: , Rfl:   •  irbesartan-hydrochlorothiazide (AVALIDE) 300-12.5 MG tablet, Take 1 tablet by mouth Daily., Disp: , Rfl:   •  Multiple Vitamin (MULTIVITAMINS PO), Take 1 tablet by mouth Daily., Disp: ,  "Rfl:   •  Omega-3 Fatty Acids (OMEGA 3 500 PO), Take 1 tablet by mouth Daily. 520 mg daily , Disp: , Rfl:   •  pantoprazole (PROTONIX) 40 MG EC tablet, TAKE 1 TABLET BY MOUTH 30 MINUTES BEFORE BREAKFAST DAILY., Disp: 90 tablet, Rfl: 1  •  traZODone (DESYREL) 50 MG tablet, Take 1 tablet by mouth At Night As Needed., Disp: , Rfl:     No Known Allergies    The following portions of the patient's history were reviewed and updated as appropriate: allergies, current medications, past family history, past medical history, past social history, past surgical history and problem list.    Objective     Physical Exam  Constitutional:       General: She is not in acute distress.     Appearance: Normal appearance. She is well-developed. She is not diaphoretic.      Comments: pleasant   HENT:      Head: Normocephalic and atraumatic.      Right Ear: External ear normal.      Left Ear: External ear normal.      Nose: Nose normal.   Eyes:      General: No scleral icterus.        Right eye: No discharge.         Left eye: No discharge.      Conjunctiva/sclera: Conjunctivae normal.   Neck:      Trachea: No tracheal deviation.   Pulmonary:      Effort: Pulmonary effort is normal. No respiratory distress.   Musculoskeletal:         General: Normal range of motion.      Cervical back: Normal range of motion.   Skin:     Coloration: Skin is not pale.      Findings: No erythema or rash.   Neurological:      Mental Status: She is alert and oriented to person, place, and time.      Coordination: Coordination normal.   Psychiatric:         Mood and Affect: Mood normal.         Behavior: Behavior normal.         Thought Content: Thought content normal.         Judgment: Judgment normal.       Vitals:    04/27/23 1304   BP: 138/76   BP Location: Left arm   Patient Position: Sitting   Cuff Size: Adult   Pulse: 57   Temp: 97 °F (36.1 °C)   TempSrc: Temporal   SpO2: 99%   Weight: 64.9 kg (143 lb)   Height: 172.7 cm (68\")     Results Review:   I " reviewed the patient's new clinical results.    Dated April 21, 2014 the patient underwent an upper endoscopy which revealed: Report unavailable.  Gastric biopsies revealed reactive gastropathy with focal surface erosion; special stain negative for Helicobacter.  Duodenal biopsies revealed no significant pathologic abnormalities.     Colonoscopy dated 10/16/2019 reveals scant pandiverticulosis.  Internal hemorrhoids.  Colon polyps.  2 were removed, 3 mm in size.  Angiodysplasia in the right colon.  One with a minimal ooze of blood.  Status post thermal ablation therapy using argon plasma coagulator.  Terminal ileum biopsy reveals unremarkable small bowel mucosa.  Random colon biopsy reveals unremarkable colonic mucosa.  Negative for acute and microscopic colitis.  Cecum polyp biopsy reveals unremarkable colonic mucosa.  Rectal polyp biopsy reveals hyperplastic polyp.  Sigmoid colon polyp biopsy reveals lymphoid aggregate.     EGD dated 1/7/2020 reveals erythematous distal esophagitis.  Nonobstructive Schatzki's type ring.  Small sliding hiatal hernia less than 3 cm.  Erythematous erosive gastritis.  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  Status post thermal ablation therapy using argon plasma coagulator.  In the presence of coagulopathy small nonbleeding vascular ectasias can explain anemia.  Second portion of duodenum biopsy reveals small bowel mucosa with no significant pathologic abnormality.  Antrum body and fundus biopsy reveals gastric mucosa with mild reactive gastropathy.  No H. pylori, intestinal metaplasia or dysplasia.     Labs 11/8/2022: Cr 0.86, AST 28, ALT 17, alk phos 71, bili 0.3, Hgb 8.4, HCT 28.1, MCV 76, platelets 381,000, total cholesterol 212, TG 59, LDL 97, ferritin 9, vit B12 1497, folate >20,  iron 23, TIBC 457, iron saturation 5%.     EGD and colonoscopy by Dr. Daugherty 2/15/2023  - Normal oropharynx.  - Z-line regular, 40 cm from the incisors.  - No gross lesions in the  entire esophagus.  - A few gastric polyps. FGPs, previously biopsied  - Erythematous mucosa in the antrum and prepyloric region of the stomach. Biopsied.  - Three non-bleeding angioectasias in the duodenum. Treated with argon plasma coagulation (APC).  - Normal third portion of the duodenum.  - Decreased sphincter tone found on digital rectal exam.  - Diverticulosis in the sigmoid colon, in the descending colon, in the ascending colon and in the cecum.  - Tortuous colon.  - One 2 to 3 mm polyp in the proximal ascending colon, removed with a cold snare. Resected and retrieved.  - One 2 to 3 mm polyp in the rectum, removed with a cold snare. Resected and retrieved.  - The examined portion of the ileum was normal. Biopsied for anemia  - No lower GI AVMs identified  Pathology DIAGNOSIS:   A.     GASTRIC BIOPSY:   Gastric antral type mucosa with reactive (chemical) gastropathy   Negative H. pylori, metaplasia or dysplasia   B.     TERMINAL ILEUM BIOPSY:   Intestinal mucosa without pathologic alterations   C.     ASCENDING COLON POLYP:   Colonic type mucosa with no significant histopathologic abnormalities   Negative for significant inflammation or atypia   D.     RECTOSIGMOID COLON, POLYP:   Fecal material only   No colon mucosa present    Pillcam 3/2/2023  Single medium sized nonbleeding AVM noted possibly in the proximal ileum at 1 hour 15 minutes 59seconds. No proximal small bowel AVMs identified.  Apart from that there was a large nonbleeding AVM in the colon which appears to be located on the left side colon.    Repeat colonoscopy by Dr. Daugherty 3/28/2023  - Diverticulosis in the sigmoid colon and in the proximal ascending colon.  - A single non-bleeding colonic angioectasia. Treated with argon beam coagulation.  - A single non-bleeding colonic angioectasia. Treatment not successful. Treated with argon plasma coagulation (APC). Clip (MR conditional) was placed. Clip : Multispan.  - The examined  portion of the ileum was normal.  No specimens collected.    Assessment / Plan      1. Iron deficiency anemia due to chronic blood loss  2. AVM (arteriovenous malformation) of colon  3. Angiodysplasia of small intestine  4. History of colon polyps  In Dec 2022, her Hgb dropped from 10 to 8 g/dL. She had EGD, colonoscopy then pillcam and a repeat colonoscopy since last visit.     EGD and colonoscopy 2/2023 showed 2 AVMs in the duodenum, none in the colon. PillCam showed right colon AVM and proximal ileum AVM. Repeat colonoscopy, had clip placement at single right colonic AVM. Previous EGD in 2020 she had vascular ectasia in stomach and duodenum. Previous colonoscopy in 2019, she had angiodysplasia in the right colon. Has a personal history of colon polyps.      No recent labs to review. She had not seen any black stools or bright red blood per rectum. No increased fatigue or other noticeable changes since her last procedures. Taking daily iron supplement as directed. She has not had any recent abdominal imaging. Labs 11/2022 showed Hgb at 8.4.    Labs now  Continue iron supplement PO once daily  Of note, still has proximal ileum AVM (not treated)  Report to ER with any severe symptoms including SOA, weakness, dizziness, black tarry stools or rectal bleeding  Will consider CTAP depending on lab results  Keep follow up with PCP     - CBC (No Diff); Future  - Ferritin; Future  - Iron Profile; Future      Follow Up:   Return in about 6 months (around 10/27/2023) for recheck anemia and GI bleeding with Dr. Daugherty.     Ashlee Marks PA-C  [facility]  5/9/2023  08:49 EDT    Dictated Utilizing Dragon Dictation: Part of this note may be an electronic transcription/translation of spoken language to printed text using the Dragon Dictation System.

## 2023-04-28 ENCOUNTER — TELEPHONE (OUTPATIENT)
Dept: GASTROENTEROLOGY | Facility: CLINIC | Age: 73
End: 2023-04-28
Payer: MEDICARE

## 2023-04-28 NOTE — TELEPHONE ENCOUNTER
Please let pt know that Hgb stable at 10 as it was prior to recent GI bleeding, continue iron supplement daily. I forwarded results to PCP.    Will plan on repeat colonoscopy in 5 years for screening. Please arrange 6 month follow up in the GI clinic.

## 2023-05-02 ENCOUNTER — TRANSCRIBE ORDERS (OUTPATIENT)
Dept: ADMINISTRATIVE | Facility: HOSPITAL | Age: 73
End: 2023-05-02
Payer: MEDICARE

## 2023-05-02 DIAGNOSIS — I70.213 ATHEROSCLEROSIS OF NATIVE ARTERIES OF EXTREMITIES WITH INTERMITTENT CLAUDICATION, BILATERAL LEGS: Primary | ICD-10-CM

## 2023-05-15 ENCOUNTER — HOSPITAL ENCOUNTER (OUTPATIENT)
Dept: ULTRASOUND IMAGING | Facility: HOSPITAL | Age: 73
Discharge: HOME | End: 2023-05-15
Admitting: FAMILY MEDICINE
Payer: MEDICARE

## 2023-05-15 DIAGNOSIS — I70.213 ATHEROSCLEROSIS OF NATIVE ARTERIES OF EXTREMITIES WITH INTERMITTENT CLAUDICATION, BILATERAL LEGS: ICD-10-CM

## 2023-05-15 PROCEDURE — 93925 LOWER EXTREMITY STUDY: CPT

## 2023-05-26 ENCOUNTER — APPOINTMENT (OUTPATIENT)
Dept: GENERAL RADIOLOGY | Facility: HOSPITAL | Age: 73
End: 2023-05-26
Payer: MEDICARE

## 2023-05-26 ENCOUNTER — HOSPITAL ENCOUNTER (EMERGENCY)
Facility: HOSPITAL | Age: 73
Discharge: HOME | End: 2023-05-26
Attending: EMERGENCY MEDICINE
Payer: MEDICARE

## 2023-05-26 VITALS
RESPIRATION RATE: 18 BRPM | TEMPERATURE: 99 F | WEIGHT: 137 LBS | HEIGHT: 64 IN | DIASTOLIC BLOOD PRESSURE: 67 MMHG | SYSTOLIC BLOOD PRESSURE: 137 MMHG | OXYGEN SATURATION: 98 % | HEART RATE: 89 BPM | BODY MASS INDEX: 23.39 KG/M2

## 2023-05-26 DIAGNOSIS — M79.675 TOE PAIN, LEFT: Primary | ICD-10-CM

## 2023-05-26 PROCEDURE — 99283 EMERGENCY DEPT VISIT LOW MDM: CPT

## 2023-05-26 PROCEDURE — 73660 X-RAY EXAM OF TOE(S): CPT

## 2023-05-26 RX ORDER — CEPHALEXIN 500 MG/1
500 CAPSULE ORAL 4 TIMES DAILY
Qty: 20 CAPSULE | Refills: 0 | Status: SHIPPED | OUTPATIENT
Start: 2023-05-26 | End: 2023-05-31

## 2023-05-26 RX ORDER — CEPHALEXIN 250 MG/1
500 CAPSULE ORAL ONCE
Status: COMPLETED | OUTPATIENT
Start: 2023-05-26 | End: 2023-05-26

## 2023-05-26 RX ORDER — TRAMADOL HYDROCHLORIDE 50 MG/1
50 TABLET ORAL ONCE
Status: COMPLETED | OUTPATIENT
Start: 2023-05-26 | End: 2023-05-26

## 2023-05-26 RX ADMIN — TRAMADOL HYDROCHLORIDE 50 MG: 50 TABLET, COATED ORAL at 13:50

## 2023-05-26 RX ADMIN — CEPHALEXIN 500 MG: 250 CAPSULE ORAL at 14:58

## 2023-05-26 NOTE — ED PROVIDER NOTES
Subjective  History of Present Illness:    Patient is a 72-year-old female here today for left toe pain.  She states that a few weeks ago she dropped a large can onto her toe causing pain and swelling.  She was then seen by her primary provider who suggested she be seen by a podiatrist.  Patient states that she was seen by podiatrist and had her toenail removed, as well as an x-ray showing an underlying fracture.  So advised to keep the toe covered and use Neosporin.  States that the pain is persisting and increasing in severity, is causing her difficulty sleeping, and she is concerned that the toe is appearing to be infected.    Nurses Notes reviewed and agree, including vitals, allergies, social history and prior medical history.     REVIEW OF SYSTEMS: All systems reviewed and not pertinent unless noted.  Review of Systems    Past Medical History:   Diagnosis Date   • Arthritis    • Body piercing     Both ears   • Borderline diabetes    • Colon polyp 10/16/2019   • Coronary artery disease 01/02/2020    Dr. Osman    • Elevated cholesterol    • History of exercise stress test    • Hyperlipidemia    • Hypertension    • PVD (peripheral vascular disease)     12 years ago    • Stroke     TIA - pt states greater than 10 years ago   • Wears glasses        Allergies:    Patient has no known allergies.      Past Surgical History:   Procedure Laterality Date   • BREAST BIOPSY Left     2001   • CARDIOVASCULAR STRESS TEST     • COLONOSCOPY N/A 10/16/2019    Procedure: COLONOSCOPY WITH ARGON THERMAL ABLATION, COLD BIOPSY POLYPECTOMIES, AND BIOPSIES;  Surgeon: Kirit Gilmore MD;  Location: [facility] ENDOSCOPY;  Service: Gastroenterology   • COLONOSCOPY N/A 02/15/2023    Procedure: COLONOSCOPY with biopsy and polypectomy ;  Surgeon: Ame Daugherty MD;  Location: [facility] ENDOSCOPY;  Service: Gastroenterology;  Laterality: N/A;   • COLONOSCOPY N/A 3/28/2023    Procedure: COLONOSCOPY WITH AVM COAGULATION OF ASCENDING COLON  "AND HEPATIC FLEXURE AND RESOLUTION CLIP PLACEMENT X1;  Surgeon: Ame Daugherty MD;  Location: [facility] ENDOSCOPY;  Service: Gastroenterology;  Laterality: N/A;   • CORONARY ANGIOPLASTY WITH STENT PLACEMENT      2007, times two stents   • ENDOSCOPY N/A 01/07/2020    Procedure: ESOPHAGOGASTRODUODENOSCOPY;  Surgeon: Kirit Gilmore MD;  Location: [facility] ENDOSCOPY;  Service: Gastroenterology   • ENDOSCOPY N/A 02/15/2023    Procedure: ESOPHAGOGASTRODUODENOSCOPY WITH BIOPSY and argon beam coagulation;  Surgeon: Ame Daugherty MD;  Location: [facility] ENDOSCOPY;  Service: Gastroenterology;  Laterality: N/A;   • HYSTERECTOMY     • INTERVENTIONAL RADIOLOGY PROCEDURE N/A 01/03/2019    Procedure: Abdominal Aortagram with Runoff;  Surgeon: Charan Osman MD;  Location: [facility] INVASIVE LOCATION;  Service: Cardiovascular   • OOPHORECTOMY           Social History     Socioeconomic History   • Marital status:    Tobacco Use   • Smoking status: Former     Types: Cigarettes     Quit date: 1/3/2013     Years since quitting: 10.3   • Smokeless tobacco: Never   Vaping Use   • Vaping Use: Never used   Substance and Sexual Activity   • Alcohol use: Yes     Comment: occassional   • Drug use: No   • Sexual activity: Defer         Family History   Problem Relation Age of Onset   • Hypertension Mother    • Colon cancer Father    • Lung cancer Father    • Breast cancer Neg Hx        Objective  Physical Exam:  /67 (BP Location: Left arm, Patient Position: Sitting)   Pulse 89   Temp 99 °F (37.2 °C) (Oral)   Resp 18   Ht 162.6 cm (64\")   Wt 62.1 kg (137 lb)   SpO2 98%   BMI 23.52 kg/m²      Physical Exam  Vitals and nursing note reviewed.   Constitutional:       Appearance: Normal appearance. She is normal weight.   HENT:      Head: Normocephalic and atraumatic.   Cardiovascular:      Rate and Rhythm: Normal rate and regular rhythm.      Pulses: Normal pulses.      Heart sounds: Normal heart sounds. "   Pulmonary:      Effort: Pulmonary effort is normal.      Breath sounds: Normal breath sounds.   Abdominal:      General: Abdomen is flat. Bowel sounds are normal. There is no distension.      Palpations: Abdomen is soft.      Tenderness: There is no abdominal tenderness.   Musculoskeletal:      Right lower leg: No edema.      Left lower leg: No edema.        Feet:    Skin:     General: Skin is warm and dry.   Neurological:      General: No focal deficit present.      Mental Status: She is alert and oriented to person, place, and time.   Psychiatric:         Mood and Affect: Mood normal.         Behavior: Behavior normal.         Procedures    ED Course:         Lab Results (last 24 hours)     ** No results found for the last 24 hours. **           XR Toe 2+ View Left    Result Date: 5/26/2023  CLINICAL INDICATION:  2nd left toe pain  EXAMINATION TECHNIQUE: XR TOE 2+ VW LEFT-  COMPARISON: None.  FINDINGS: Soft tissue swelling of the second digit distal phalanx. No evidence of osseous destruction or periosteal reaction. No acute fracture. No radiodense foreign bodies. Mild first MTP joint degenerative changes with adjacent soft tissue swelling and the calcifications, likely sequela of prior gout.      Impression: Soft tissue swelling of the second is in distal phalanx. No acute osseous findings.    Images personally reviewed, interpreted and dictated by IOANA Cotter.       This report was signed and finalized on 5/26/2023 2:36 PM by IOANA Cotter.         MDM    Initial impression of presenting illness: Left second toe pain    DDX: includes but is not limited to: Soft tissue injury, cellulitis, phalanx fracture    Patient arrives hemodynamically stable with vitals interpreted by myself.     Pertinent features from physical exam: Toe exam as described above.    Initial diagnostic plan: Foot x-ray    Results from initial plan were reviewed and interpreted by me revealing no obvious fracture noted to  distal phalanx, radiology report still pending.    Diagnostic information from other sources: Medical record    Interventions / Re-evaluation: Patient was given tramadol for pain.  After receiving her x-ray, she was given cephalexin for probable underlying infection.  Advised her to leave the toe open to air as it has a macerated appearance currently.  She can continue to use the antibiotic ointment on top of the nailbed, but she should not wrap the toe.  First dose of antibiotics provided in the ER, prescription take as directed.  She is to maintain her upcoming appointment with her podiatrist.    Results/clinical rationale were discussed with Dr. Vincent    Consultations/Discussion of results with other physicians: None    Disposition plan: Patient discharged home in stable condition.  -----    Final diagnoses:   Toe pain, left        Sean Queen, APRN  05/26/23 [id number]

## 2023-05-26 NOTE — DISCHARGE INSTRUCTIONS
Keep the toe open to air. Can place a small amount of the ointment on the nail bed. First dose of antibiotics given in the ER, the prescription filled and take another dose tonight.  Then start taking 4 times a day tomorrow.  Use the prescribed pain medication as needed along with Tylenol intermittently.  Maintain your upcoming appointment with the podiatrist.  Follow-up with your primary provider as needed.

## 2024-01-25 ENCOUNTER — TRANSCRIBE ORDERS (OUTPATIENT)
Dept: ADMINISTRATIVE | Facility: HOSPITAL | Age: 74
End: 2024-01-25
Payer: MEDICARE

## 2024-01-25 DIAGNOSIS — Z12.31 VISIT FOR SCREENING MAMMOGRAM: Primary | ICD-10-CM

## 2024-02-21 ENCOUNTER — TRANSCRIBE ORDERS (OUTPATIENT)
Dept: ADMINISTRATIVE | Facility: HOSPITAL | Age: 74
End: 2024-02-21
Payer: MEDICARE

## 2024-02-21 DIAGNOSIS — Z78.0 ASYMPTOMATIC MENOPAUSAL STATE: Primary | ICD-10-CM

## 2024-04-26 ENCOUNTER — APPOINTMENT (OUTPATIENT)
Dept: BONE DENSITY | Facility: HOSPITAL | Age: 74
End: 2024-04-26
Payer: MEDICARE

## 2024-04-26 ENCOUNTER — HOSPITAL ENCOUNTER (OUTPATIENT)
Dept: MAMMOGRAPHY | Facility: HOSPITAL | Age: 74
Discharge: HOME | End: 2024-04-26
Payer: MEDICARE

## 2024-04-26 DIAGNOSIS — Z12.31 VISIT FOR SCREENING MAMMOGRAM: ICD-10-CM

## 2024-04-26 DIAGNOSIS — Z78.0 ASYMPTOMATIC MENOPAUSAL STATE: ICD-10-CM

## 2024-04-26 PROCEDURE — 77063 BREAST TOMOSYNTHESIS BI: CPT

## 2024-04-26 PROCEDURE — 77067 SCR MAMMO BI INCL CAD: CPT

## 2024-04-26 PROCEDURE — 77080 DXA BONE DENSITY AXIAL: CPT

## 2024-08-19 ENCOUNTER — OFFICE VISIT (OUTPATIENT)
Dept: GASTROENTEROLOGY | Facility: CLINIC | Age: 74
End: 2024-08-19
Payer: MEDICARE

## 2024-08-19 ENCOUNTER — LAB (OUTPATIENT)
Dept: LAB | Facility: HOSPITAL | Age: 74
End: 2024-08-19
Payer: MEDICARE

## 2024-08-19 VITALS — HEART RATE: 67 BPM | DIASTOLIC BLOOD PRESSURE: 78 MMHG | OXYGEN SATURATION: 98 % | SYSTOLIC BLOOD PRESSURE: 120 MMHG

## 2024-08-19 DIAGNOSIS — D50.0 IRON DEFICIENCY ANEMIA DUE TO CHRONIC BLOOD LOSS: Primary | ICD-10-CM

## 2024-08-19 DIAGNOSIS — D50.0 IRON DEFICIENCY ANEMIA DUE TO CHRONIC BLOOD LOSS: ICD-10-CM

## 2024-08-19 DIAGNOSIS — K55.20 ANGIODYSPLASIA OF COLON: ICD-10-CM

## 2024-08-19 PROCEDURE — 1160F RVW MEDS BY RX/DR IN RCRD: CPT | Performed by: INTERNAL MEDICINE

## 2024-08-19 PROCEDURE — 1159F MED LIST DOCD IN RCRD: CPT | Performed by: INTERNAL MEDICINE

## 2024-08-19 PROCEDURE — 3074F SYST BP LT 130 MM HG: CPT | Performed by: INTERNAL MEDICINE

## 2024-08-19 PROCEDURE — 85025 COMPLETE CBC W/AUTO DIFF WBC: CPT

## 2024-08-19 PROCEDURE — 3078F DIAST BP <80 MM HG: CPT | Performed by: INTERNAL MEDICINE

## 2024-08-19 PROCEDURE — 80053 COMPREHEN METABOLIC PANEL: CPT

## 2024-08-19 PROCEDURE — 99213 OFFICE O/P EST LOW 20 MIN: CPT | Performed by: INTERNAL MEDICINE

## 2024-08-19 PROCEDURE — 36415 COLL VENOUS BLD VENIPUNCTURE: CPT

## 2024-08-19 RX ORDER — GABAPENTIN 300 MG/1
300 CAPSULE ORAL DAILY
COMMUNITY

## 2024-08-19 RX ORDER — EZETIMIBE 10 MG/1
10 TABLET ORAL DAILY
COMMUNITY

## 2024-08-19 NOTE — PROGRESS NOTES
Follow Up Note     Date: 2024   Patient Name: Annalise Corona  MRN: [id number]  : 1950     Primary Care Provider: Lisa Martinez MD     Chief Complaint   Patient presents with    Anemia    Colon Polyps     History of present illness:   2024  Annalise Corona is a 73 y.o. female who is here today for follow up for her history of anemia GI bleeding.  She denies further episodes of melena or red blood bleeding.  Denies any abdominal pain has been having daily bowel movement.  She did have a issues with a severe peripheral artery disease and had a above-knee amputation left knee in 2024.     2023  Annalise Corona is a 72 y.o. female who is here today for follow up regarding Results (PillCam, EGD and Colonoscopy x2) and anemia. She has not had any black stools or rectal bleeding. Feeling well today. She is still taking oral iron therapy daily. Had repeat colonoscopy as directed and clip placed on AVM. Her Hgb has not been checked in months per her report. Would like to discuss recent Pillcam, EGD, colonoscopy and repeat colonoscopy.      2019 Dr. Gilmore  For colon cancer screening.  The patient denies recent change in bowel habits.  There is no diarrhea or constipation.  There is no history of abdominal pain.  There is no history of overt GI bleed (hematemesis melena or hematochezia).  The patient denies nausea or vomiting.  There is no history of reflux.  The patient denies dysphagia or odynophagia.  There is no history of recent significant weight loss.  There is no history of liver or pancreatic disease.     The patient had undergone a colonoscopy about 5 years ago in [facility].  There is history of colon polyps.  Unfortunately, the details are not known.  The patient was recommended to have a follow-up colonoscopy in 5 years.  Recently, in May 2019 the patient was noted to be mildly anemic with hemoglobin of 11.6.  There is no history of gross hematuria, nosebleeds or  vaginal bleeds.  There is no associated weakness, dizziness, chest pains or excessive shortness of breath.  The patient has history of peripheral vascular disease as well as coronary artery disease for which she had stents placed in the past.  Currently the patient is on aspirin and Plavix.    Subjective      Past Medical History:   Diagnosis Date    Arthritis     Body piercing     Both ears    Borderline diabetes     Colon polyp 10/16/2019    Coronary artery disease 01/02/2020    Dr. Osman     Elevated cholesterol     History of exercise stress test     Hyperlipidemia     Hypertension     PVD (peripheral vascular disease)     12 years ago     Stroke     TIA - pt states greater than 10 years ago    Wears glasses      Past Surgical History:   Procedure Laterality Date    BREAST BIOPSY Left     2001    CARDIOVASCULAR STRESS TEST      COLONOSCOPY N/A 10/16/2019    Procedure: COLONOSCOPY WITH ARGON THERMAL ABLATION, COLD BIOPSY POLYPECTOMIES, AND BIOPSIES;  Surgeon: Kirit Gilmore MD;  Location: [facility];  Service: Gastroenterology    COLONOSCOPY N/A 02/15/2023    Procedure: COLONOSCOPY with biopsy and polypectomy ;  Surgeon: Ame Daugherty MD;  Location: [facility];  Service: Gastroenterology;  Laterality: N/A;    COLONOSCOPY N/A 3/28/2023    Procedure: COLONOSCOPY WITH AVM COAGULATION OF ASCENDING COLON AND HEPATIC FLEXURE AND RESOLUTION CLIP PLACEMENT X1;  Surgeon: Ame Daugherty MD;  Location: [facility];  Service: Gastroenterology;  Laterality: N/A;    CORONARY ANGIOPLASTY WITH STENT PLACEMENT      2007, times two stents    ENDOSCOPY N/A 01/07/2020    Procedure: ESOPHAGOGASTRODUODENOSCOPY;  Surgeon: Kirit Gilmore MD;  Location: [facility];  Service: Gastroenterology    ENDOSCOPY N/A 02/15/2023    Procedure: ESOPHAGOGASTRODUODENOSCOPY WITH BIOPSY and argon beam coagulation;  Surgeon: Ame Daugherty MD;  Location: [facility];  Service: Gastroenterology;   Laterality: N/A;    HYSTERECTOMY      INTERVENTIONAL RADIOLOGY PROCEDURE N/A 2019    Procedure: Abdominal Aortagram with Runoff;  Surgeon: Charan Osman MD;  Location: [facility] INVASIVE LOCATION;  Service: Cardiovascular    OOPHORECTOMY       Family History   Problem Relation Age of Onset    Hypertension Mother     Colon cancer Father     Lung cancer Father     Breast cancer Neg Hx      Social History     Socioeconomic History    Marital status:    Tobacco Use    Smoking status: Former     Current packs/day: 0.00     Types: Cigarettes     Quit date: 1/3/2013     Years since quittin.6    Smokeless tobacco: Never   Vaping Use    Vaping status: Never Used   Substance and Sexual Activity    Alcohol use: Yes     Comment: occassional    Drug use: No    Sexual activity: Defer       Current Outpatient Medications:     amLODIPine (NORVASC) 10 MG tablet, Take 1 tablet by mouth every night at bedtime., Disp: , Rfl:     aspirin 81 MG EC tablet, Take 1 tablet by mouth Daily., Disp: , Rfl:     calcium carbonate (OS-PASCALE) 600 MG tablet, Take 1 tablet by mouth 2 (Two) Times a Day With Meals., Disp: , Rfl:     cholecalciferol (VITAMIN D3) 400 units tablet, Take 1 tablet by mouth 2 (Two) Times a Day., Disp: , Rfl:     clopidogrel (PLAVIX) 75 MG tablet, Take 1 tablet by mouth Daily., Disp: , Rfl:     ezetimibe (ZETIA) 10 MG tablet, Take 1 tablet by mouth Daily., Disp: , Rfl:     ferrous gluconate (FERGON) 324 MG tablet, Take 1 tablet by mouth Daily With Breakfast., Disp: , Rfl:     gabapentin (NEURONTIN) 300 MG capsule, Take 1 capsule by mouth Daily., Disp: , Rfl:     irbesartan-hydrochlorothiazide (AVALIDE) 300-12.5 MG tablet, Take 1 tablet by mouth Daily., Disp: , Rfl:     Multiple Vitamin (MULTIVITAMINS PO), Take 1 tablet by mouth Daily., Disp: , Rfl:     pantoprazole (PROTONIX) 40 MG EC tablet, TAKE 1 TABLET BY MOUTH 30 MINUTES BEFORE BREAKFAST DAILY., Disp: 90 tablet, Rfl: 1    Rosuvastatin Calcium 40  MG capsule sprinkle, Take  by mouth., Disp: , Rfl:     traZODone (DESYREL) 50 MG tablet, Take 1 tablet by mouth At Night As Needed., Disp: , Rfl:   No Known Allergies  The following portions of the patient's history were reviewed and updated as appropriate: allergies, current medications, past family history, past medical history, past social history, past surgical history and problem list.  Objective     Physical Exam  Constitutional:       Appearance: Normal appearance.      Comments: Patient is in a wheelchair.  Left above-knee amputation noted   HENT:      Head: Normocephalic and atraumatic.   Eyes:      Comments: Mild pallor noted   Abdominal:      General: Abdomen is flat. There is no distension.      Palpations: There is no mass.      Tenderness: There is no abdominal tenderness. There is no guarding or rebound.      Hernia: No hernia is present.   Musculoskeletal:      Cervical back: Normal range of motion and neck supple.   Neurological:      Mental Status: She is alert.       Vitals:    08/19/24 1549   BP: 120/78   Pulse: 67   SpO2: 98%     There is no height or weight on file to calculate BMI.     Results Review:   I reviewed the patient's new clinical results.    No visits with results within 90 Day(s) from this visit.   Latest known visit with results is:   Lab on 04/27/2023   Component Date Value Ref Range Status    WBC 04/27/2023 5.04  3.40 - 10.80 10*3/mm3 Final    RBC 04/27/2023 3.92  3.77 - 5.28 10*6/mm3 Final    Hemoglobin 04/27/2023 10.7 (L)  12.0 - 15.9 g/dL Final    Hematocrit 04/27/2023 32.3 (L)  34.0 - 46.6 % Final    MCV 04/27/2023 82.4  79.0 - 97.0 fL Final    MCH 04/27/2023 27.3  26.6 - 33.0 pg Final    MCHC 04/27/2023 33.1  31.5 - 35.7 g/dL Final    RDW 04/27/2023 14.6  12.3 - 15.4 % Final    RDW-SD 04/27/2023 43.3  37.0 - 54.0 fl Final    MPV 04/27/2023 10.8  6.0 - 12.0 fL Final    Platelets 04/27/2023 355  140 - 450 10*3/mm3 Final    Ferritin 04/27/2023 29.70  13.00 - 150.00 ng/mL Final     Iron 04/27/2023 39  37 - 145 mcg/dL Final    Iron Saturation (TSAT) 04/27/2023 9 (L)  20 - 50 % Final    Transferrin 04/27/2023 299  200 - 360 mg/dL Final    TIBC 04/27/2023 446  298 - 536 mcg/dL Final      No radiology results for the last 90 days.       Dated April 21, 2014 the patient underwent an upper endoscopy which revealed: Report unavailable.  Gastric biopsies revealed reactive gastropathy with focal surface erosion; special stain negative for Helicobacter.  Duodenal biopsies revealed no significant pathologic abnormalities.     Colonoscopy dated 10/16/2019 reveals scant pandiverticulosis.  Internal hemorrhoids.  Colon polyps.  2 were removed, 3 mm in size.  Angiodysplasia in the right colon.  One with a minimal ooze of blood.  Status post thermal ablation therapy using argon plasma coagulator.  Terminal ileum biopsy reveals unremarkable small bowel mucosa.  Random colon biopsy reveals unremarkable colonic mucosa.  Negative for acute and microscopic colitis.  Cecum polyp biopsy reveals unremarkable colonic mucosa.  Rectal polyp biopsy reveals hyperplastic polyp.  Sigmoid colon polyp biopsy reveals lymphoid aggregate.     EGD dated 1/7/2020 reveals erythematous distal esophagitis.  Nonobstructive Schatzki's type ring.  Small sliding hiatal hernia less than 3 cm.  Erythematous erosive gastritis.  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  Status post thermal ablation therapy using argon plasma coagulator.  In the presence of coagulopathy small nonbleeding vascular ectasias can explain anemia.  Second portion of duodenum biopsy reveals small bowel mucosa with no significant pathologic abnormality.  Antrum body and fundus biopsy reveals gastric mucosa with mild reactive gastropathy.  No H. pylori, intestinal metaplasia or dysplasia.     Labs 11/8/2022: Cr 0.86, AST 28, ALT 17, alk phos 71, bili 0.3, Hgb 8.4, HCT 28.1, MCV 76, platelets 381,000, total cholesterol 212, TG 59, LDL 97, ferritin 9,  vit B12 1497, folate >20,  iron 23, TIBC 457, iron saturation 5%.      EGD and colonoscopy by Dr. Daugherty 2/15/2023  - Normal oropharynx.  - Z-line regular, 40 cm from the incisors.  - No gross lesions in the entire esophagus.  - A few gastric polyps. FGPs, previously biopsied  - Erythematous mucosa in the antrum and prepyloric region of the stomach. Biopsied.  - Three non-bleeding angioectasias in the duodenum. Treated with argon plasma coagulation (APC).  - Normal third portion of the duodenum.  - Decreased sphincter tone found on digital rectal exam.  - Diverticulosis in the sigmoid colon, in the descending colon, in the ascending colon and in the cecum.  - Tortuous colon.  - One 2 to 3 mm polyp in the proximal ascending colon, removed with a cold snare. Resected and retrieved.  - One 2 to 3 mm polyp in the rectum, removed with a cold snare. Resected and retrieved.  - The examined portion of the ileum was normal. Biopsied for anemia  - No lower GI AVMs identified  Pathology DIAGNOSIS:   A.     GASTRIC BIOPSY:   Gastric antral type mucosa with reactive (chemical) gastropathy   Negative H. pylori, metaplasia or dysplasia   B.     TERMINAL ILEUM BIOPSY:   Intestinal mucosa without pathologic alterations   C.     ASCENDING COLON POLYP:   Colonic type mucosa with no significant histopathologic abnormalities   Negative for significant inflammation or atypia   D.     RECTOSIGMOID COLON, POLYP:   Fecal material only   No colon mucosa present     Pillcam 3/2/2023  Single medium sized nonbleeding AVM noted possibly in the proximal ileum at 1 hour 15 minutes 59seconds. No proximal small bowel AVMs identified.  Apart from that there was a large nonbleeding AVM in the colon which appears to be located on the left side colon.     Repeat colonoscopy by Dr. Daugherty 3/28/2023  - Diverticulosis in the sigmoid colon and in the proximal ascending colon.  - A single non-bleeding colonic angioectasia. Treated with argon beam  coagulation.  - A single non-bleeding colonic angioectasia. Treatment not successful. Treated with argon plasma coagulation (APC). Clip (MR conditional) was placed. Clip : [employer].  - The examined portion of the ileum was normal.  No specimens collected.    Assessment / Plan      1. Iron deficiency anemia due to chronic blood loss  2. AVM (arteriovenous malformation) of colon  3. Angiodysplasia of small intestine  8/19/2024  Patient is clinically doing well without any further history of GI bleeding.  Has been having daily bowel movement.  Her last hemoglobin in April 2023 was 10.7 however subsequently in July her hemoglobin dropped to 8.2 g/dL when she had a left above-knee amputation.  No further lab work on record.    Will get a CBC CMP today  If her hemoglobin is stable we will DC her iron pills  Okay to continue aspirin Plavix as long as there is no bleeding  No significant reflux symptoms now not on PPI  Follow-up in 6 months if today's blood work reveals stable hemoglobin    4/27/2023  In Dec 2022, her Hgb dropped from 10 to 8 g/dL. She had EGD, colonoscopy then pillcam and a repeat colonoscopy since last visit. EGD and colonoscopy 2/2023 showed 2 AVMs in the duodenum, none in the colon. PillCam showed right colon AVM and proximal ileum AVM. Repeat colonoscopy, had clip placement at single right colonic AVM. Previous EGD in 2020 she had vascular ectasia in stomach and duodenum. Previous colonoscopy in 2019, she had angiodysplasia in the right colon. Has a personal history of colon polyps.      Prior history  4. History of colon polyps  She has a prior history of colon polyps.  Last colonoscopy on 3/28/2023 did not reveal any colon polyps.  Will consider colonoscopy in 2028       Dr. Elie thompson MD  8/19/2024    Please note that portions of this note were completed with a voice recognition program.

## 2024-08-20 LAB
ALBUMIN SERPL-MCNC: 4.4 G/DL (ref 3.5–5.2)
ALBUMIN/GLOB SERPL: 1.5 G/DL
ALP SERPL-CCNC: 69 U/L (ref 39–117)
ALT SERPL W P-5'-P-CCNC: 42 U/L (ref 1–33)
ANION GAP SERPL CALCULATED.3IONS-SCNC: 12.1 MMOL/L (ref 5–15)
AST SERPL-CCNC: 37 U/L (ref 1–32)
BASOPHILS # BLD AUTO: 0.04 10*3/MM3 (ref 0–0.2)
BASOPHILS NFR BLD AUTO: 0.8 % (ref 0–1.5)
BILIRUB SERPL-MCNC: <0.2 MG/DL (ref 0–1.2)
BUN SERPL-MCNC: 14 MG/DL (ref 8–23)
BUN/CREAT SERPL: 23 (ref 7–25)
CALCIUM SPEC-SCNC: 9.8 MG/DL (ref 8.6–10.5)
CHLORIDE SERPL-SCNC: 100 MMOL/L (ref 98–107)
CO2 SERPL-SCNC: 24.9 MMOL/L (ref 22–29)
CREAT SERPL-MCNC: 0.61 MG/DL (ref 0.57–1)
DEPRECATED RDW RBC AUTO: 38.9 FL (ref 37–54)
EGFRCR SERPLBLD CKD-EPI 2021: 95.7 ML/MIN/1.73
EOSINOPHIL # BLD AUTO: 0.02 10*3/MM3 (ref 0–0.4)
EOSINOPHIL NFR BLD AUTO: 0.4 % (ref 0.3–6.2)
ERYTHROCYTE [DISTWIDTH] IN BLOOD BY AUTOMATED COUNT: 13.1 % (ref 12.3–15.4)
GLOBULIN UR ELPH-MCNC: 2.9 GM/DL
GLUCOSE SERPL-MCNC: 78 MG/DL (ref 65–99)
HCT VFR BLD AUTO: 35 % (ref 34–46.6)
HGB BLD-MCNC: 11.3 G/DL (ref 12–15.9)
IMM GRANULOCYTES # BLD AUTO: 0.01 10*3/MM3 (ref 0–0.05)
IMM GRANULOCYTES NFR BLD AUTO: 0.2 % (ref 0–0.5)
LYMPHOCYTES # BLD AUTO: 1.47 10*3/MM3 (ref 0.7–3.1)
LYMPHOCYTES NFR BLD AUTO: 29.8 % (ref 19.6–45.3)
MCH RBC QN AUTO: 26.8 PG (ref 26.6–33)
MCHC RBC AUTO-ENTMCNC: 32.3 G/DL (ref 31.5–35.7)
MCV RBC AUTO: 83.1 FL (ref 79–97)
MONOCYTES # BLD AUTO: 0.45 10*3/MM3 (ref 0.1–0.9)
MONOCYTES NFR BLD AUTO: 9.1 % (ref 5–12)
NEUTROPHILS NFR BLD AUTO: 2.94 10*3/MM3 (ref 1.7–7)
NEUTROPHILS NFR BLD AUTO: 59.7 % (ref 42.7–76)
NRBC BLD AUTO-RTO: 0 /100 WBC (ref 0–0.2)
PLATELET # BLD AUTO: 337 10*3/MM3 (ref 140–450)
PMV BLD AUTO: 10.8 FL (ref 6–12)
POTASSIUM SERPL-SCNC: 4.6 MMOL/L (ref 3.5–5.2)
PROT SERPL-MCNC: 7.3 G/DL (ref 6–8.5)
RBC # BLD AUTO: 4.21 10*6/MM3 (ref 3.77–5.28)
SODIUM SERPL-SCNC: 137 MMOL/L (ref 136–145)
WBC NRBC COR # BLD AUTO: 4.93 10*3/MM3 (ref 3.4–10.8)

## 2025-03-04 ENCOUNTER — OFFICE VISIT (OUTPATIENT)
Dept: GASTROENTEROLOGY | Facility: CLINIC | Age: 75
End: 2025-03-04
Payer: MEDICARE

## 2025-03-04 ENCOUNTER — LAB (OUTPATIENT)
Dept: LAB | Facility: HOSPITAL | Age: 75
End: 2025-03-04
Payer: MEDICARE

## 2025-03-04 VITALS
TEMPERATURE: 98.2 F | DIASTOLIC BLOOD PRESSURE: 68 MMHG | BODY MASS INDEX: 23.52 KG/M2 | SYSTOLIC BLOOD PRESSURE: 142 MMHG | HEIGHT: 64 IN | HEART RATE: 60 BPM

## 2025-03-04 DIAGNOSIS — R79.89 ELEVATED LFTS: ICD-10-CM

## 2025-03-04 DIAGNOSIS — D50.0 IRON DEFICIENCY ANEMIA DUE TO CHRONIC BLOOD LOSS: ICD-10-CM

## 2025-03-04 DIAGNOSIS — D50.0 IRON DEFICIENCY ANEMIA DUE TO CHRONIC BLOOD LOSS: Primary | ICD-10-CM

## 2025-03-04 LAB
BASOPHILS # BLD AUTO: 0.06 10*3/MM3 (ref 0–0.2)
BASOPHILS NFR BLD AUTO: 1.1 % (ref 0–1.5)
DEPRECATED RDW RBC AUTO: 41.8 FL (ref 37–54)
EOSINOPHIL # BLD AUTO: 0.01 10*3/MM3 (ref 0–0.4)
EOSINOPHIL NFR BLD AUTO: 0.2 % (ref 0.3–6.2)
ERYTHROCYTE [DISTWIDTH] IN BLOOD BY AUTOMATED COUNT: 13.5 % (ref 12.3–15.4)
HCT VFR BLD AUTO: 39.8 % (ref 34–46.6)
HCV AB SER QL: NORMAL
HGB BLD-MCNC: 12.7 G/DL (ref 12–15.9)
IMM GRANULOCYTES # BLD AUTO: 0 10*3/MM3 (ref 0–0.05)
IMM GRANULOCYTES NFR BLD AUTO: 0 % (ref 0–0.5)
LYMPHOCYTES # BLD AUTO: 1.9 10*3/MM3 (ref 0.7–3.1)
LYMPHOCYTES NFR BLD AUTO: 35.7 % (ref 19.6–45.3)
MCH RBC QN AUTO: 27.3 PG (ref 26.6–33)
MCHC RBC AUTO-ENTMCNC: 31.9 G/DL (ref 31.5–35.7)
MCV RBC AUTO: 85.6 FL (ref 79–97)
MONOCYTES # BLD AUTO: 0.38 10*3/MM3 (ref 0.1–0.9)
MONOCYTES NFR BLD AUTO: 7.1 % (ref 5–12)
NEUTROPHILS NFR BLD AUTO: 2.97 10*3/MM3 (ref 1.7–7)
NEUTROPHILS NFR BLD AUTO: 55.9 % (ref 42.7–76)
NRBC BLD AUTO-RTO: 0 /100 WBC (ref 0–0.2)
PLATELET # BLD AUTO: 343 10*3/MM3 (ref 140–450)
PMV BLD AUTO: 10.5 FL (ref 6–12)
RBC # BLD AUTO: 4.65 10*6/MM3 (ref 3.77–5.28)
WBC NRBC COR # BLD AUTO: 5.32 10*3/MM3 (ref 3.4–10.8)

## 2025-03-04 PROCEDURE — 3077F SYST BP >= 140 MM HG: CPT | Performed by: INTERNAL MEDICINE

## 2025-03-04 PROCEDURE — 1160F RVW MEDS BY RX/DR IN RCRD: CPT | Performed by: INTERNAL MEDICINE

## 2025-03-04 PROCEDURE — 3078F DIAST BP <80 MM HG: CPT | Performed by: INTERNAL MEDICINE

## 2025-03-04 PROCEDURE — 36415 COLL VENOUS BLD VENIPUNCTURE: CPT

## 2025-03-04 PROCEDURE — 86803 HEPATITIS C AB TEST: CPT

## 2025-03-04 PROCEDURE — 82728 ASSAY OF FERRITIN: CPT

## 2025-03-04 PROCEDURE — 83540 ASSAY OF IRON: CPT

## 2025-03-04 PROCEDURE — 84466 ASSAY OF TRANSFERRIN: CPT

## 2025-03-04 PROCEDURE — 80053 COMPREHEN METABOLIC PANEL: CPT

## 2025-03-04 PROCEDURE — 85025 COMPLETE CBC W/AUTO DIFF WBC: CPT

## 2025-03-04 PROCEDURE — 99214 OFFICE O/P EST MOD 30 MIN: CPT | Performed by: INTERNAL MEDICINE

## 2025-03-04 PROCEDURE — 1159F MED LIST DOCD IN RCRD: CPT | Performed by: INTERNAL MEDICINE

## 2025-03-04 RX ORDER — LOSARTAN POTASSIUM 25 MG/1
1 TABLET ORAL 2 TIMES DAILY
COMMUNITY
Start: 2024-04-08

## 2025-03-04 RX ORDER — OXYBUTYNIN CHLORIDE 5 MG/1
1 TABLET, EXTENDED RELEASE ORAL DAILY
COMMUNITY
Start: 2024-06-03

## 2025-03-04 RX ORDER — PANTOPRAZOLE SODIUM 40 MG/1
40 TABLET, DELAYED RELEASE ORAL DAILY
COMMUNITY

## 2025-03-04 RX ORDER — FERROUS SULFATE 324(65)MG
324 TABLET, DELAYED RELEASE (ENTERIC COATED) ORAL
COMMUNITY

## 2025-03-04 NOTE — PROGRESS NOTES
Follow Up Note     Date: 2025   Patient Name: Annalise Corona  MRN: [id number]  : 1950     Referring Physician: Lisa Martinez MD    Chief Complaint:    Chief Complaint   Patient presents with    Anemia    angiodysplasia of colon       Interval History:   3/4/2025  Annalise Corona is a 74 y.o. female who is here today for follow up for her anemia.  She has been doing well without any major issues.  Denies any blood in the stool or black stool.  Has been having daily bowel movement.  She is here for follow-up after her last lab work.      2023  Annalise Corona is a 72 y.o. female who is here today for follow up regarding Results (PillCam, EGD and Colonoscopy x2) and anemia. She has not had any black stools or rectal bleeding. Feeling well today. She is still taking oral iron therapy daily. Had repeat colonoscopy as directed and clip placed on AVM. Her Hgb has not been checked in months per her report. Would like to discuss recent Pillcam, EGD, colonoscopy and repeat colonoscopy.      2019 Dr. Gilmore  For colon cancer screening.  The patient denies recent change in bowel habits.  There is no diarrhea or constipation.  There is no history of abdominal pain.  There is no history of overt GI bleed (hematemesis melena or hematochezia).  The patient denies nausea or vomiting.  There is no history of reflux.  The patient denies dysphagia or odynophagia.  There is no history of recent significant weight loss.  There is no history of liver or pancreatic disease.     The patient had undergone a colonoscopy about 5 years ago in [facility].  There is history of colon polyps.  Unfortunately, the details are not known.  The patient was recommended to have a follow-up colonoscopy in 5 years.  Recently, in May 2019 the patient was noted to be mildly anemic with hemoglobin of 11.6.  There is no history of gross hematuria, nosebleeds or vaginal bleeds.  There is no associated weakness, dizziness,  chest pains or excessive shortness of breath.  The patient has history of peripheral vascular disease as well as coronary artery disease for which she had stents placed in the past.  Currently the patient is on aspirin and Plavix.    Subjective      Past Medical History:   Past Medical History:   Diagnosis Date    Arthritis     Body piercing     Both ears    Borderline diabetes     Colon polyp 10/16/2019    Coronary artery disease 01/02/2020    Dr. Osman     Elevated cholesterol     History of exercise stress test     Hyperlipidemia     Hypertension     PVD (peripheral vascular disease)     12 years ago     Stroke     TIA - pt states greater than 10 years ago    Wears glasses      Past Surgical History:   Past Surgical History:   Procedure Laterality Date    BREAST BIOPSY Left     2001    CARDIOVASCULAR STRESS TEST      COLONOSCOPY N/A 10/16/2019    Procedure: COLONOSCOPY WITH ARGON THERMAL ABLATION, COLD BIOPSY POLYPECTOMIES, AND BIOPSIES;  Surgeon: Kirit Gilmore MD;  Location: [facility] ENDOSCOPY;  Service: Gastroenterology    COLONOSCOPY N/A 02/15/2023    Procedure: COLONOSCOPY with biopsy and polypectomy ;  Surgeon: Ame Daugherty MD;  Location: [facility] ENDOSCOPY;  Service: Gastroenterology;  Laterality: N/A;    COLONOSCOPY N/A 3/28/2023    Procedure: COLONOSCOPY WITH AVM COAGULATION OF ASCENDING COLON AND HEPATIC FLEXURE AND RESOLUTION CLIP PLACEMENT X1;  Surgeon: Ame Daugherty MD;  Location: [facility] ENDOSCOPY;  Service: Gastroenterology;  Laterality: N/A;    CORONARY ANGIOPLASTY WITH STENT PLACEMENT      2007, times two stents    ENDOSCOPY N/A 01/07/2020    Procedure: ESOPHAGOGASTRODUODENOSCOPY;  Surgeon: Kirit Gilmore MD;  Location: [facility] ENDOSCOPY;  Service: Gastroenterology    ENDOSCOPY N/A 02/15/2023    Procedure: ESOPHAGOGASTRODUODENOSCOPY WITH BIOPSY and argon beam coagulation;  Surgeon: Ame Daugherty MD;  Location: [facility] ENDOSCOPY;  Service: Gastroenterology;  Laterality:  N/A;    HYSTERECTOMY      INTERVENTIONAL RADIOLOGY PROCEDURE N/A 2019    Procedure: Abdominal Aortagram with Runoff;  Surgeon: Charan Osman MD;  Location: [facility] INVASIVE LOCATION;  Service: Cardiovascular    OOPHORECTOMY         Family History:   Family History   Problem Relation Age of Onset    Hypertension Mother     Colon cancer Father     Lung cancer Father     Breast cancer Neg Hx        Social History:   Social History     Socioeconomic History    Marital status:    Tobacco Use    Smoking status: Former     Current packs/day: 0.00     Types: Cigarettes     Quit date: 1/3/2013     Years since quittin.1    Smokeless tobacco: Never   Vaping Use    Vaping status: Never Used   Substance and Sexual Activity    Alcohol use: Yes     Comment: occassional    Drug use: No    Sexual activity: Defer       Medications:     Current Outpatient Medications:     amLODIPine (NORVASC) 5 MG tablet, Take 2 tablets by mouth every night at bedtime., Disp: , Rfl:     aspirin 81 MG EC tablet, Take 1 tablet by mouth Daily., Disp: , Rfl:     calcium carbonate (OS-PASCALE) 600 MG tablet, Take 1 tablet by mouth 2 (Two) Times a Day With Meals., Disp: , Rfl:     clopidogrel (PLAVIX) 75 MG tablet, Take 1 tablet by mouth Daily., Disp: , Rfl:     ezetimibe (ZETIA) 10 MG tablet, Take 1 tablet by mouth Daily., Disp: , Rfl:     ferrous sulfate 324 (65 Fe) MG tablet delayed-release EC tablet, Take 1 tablet by mouth Daily With Breakfast., Disp: , Rfl:     gabapentin (NEURONTIN) 400 MG capsule, Take 1 capsule by mouth 3 (Three) Times a Day., Disp: , Rfl:     losartan (COZAAR) 25 MG tablet, Take 1 tablet by mouth 2 (Two) Times a Day., Disp: , Rfl:     Multiple Vitamin (MULTIVITAMINS PO), Take 1 tablet by mouth Daily., Disp: , Rfl:     oxybutynin XL (DITROPAN-XL) 5 MG 24 hr tablet, Take 1 tablet by mouth Daily., Disp: , Rfl:     pantoprazole (PROTONIX) 40 MG EC tablet, Take 1 tablet by mouth Daily., Disp: , Rfl:      "traZODone (DESYREL) 100 MG tablet, Take 1 tablet by mouth At Night As Needed., Disp: , Rfl:     Allergies:   No Known Allergies    Review of Systems:   Review of Systems   Constitutional:  Negative for appetite change, fatigue, fever and unexpected weight loss.   HENT:  Negative for trouble swallowing.    Gastrointestinal:  Negative for abdominal distention, abdominal pain, anal bleeding, blood in stool, constipation, diarrhea, nausea, rectal pain, vomiting, GERD and indigestion.       The following portions of the patient's history were reviewed and updated as appropriate: allergies, current medications, past family history, past medical history, past social history, past surgical history and problem list.    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   HENT:      Head: Normocephalic and atraumatic.   Eyes:      Comments: Pallor present   Abdominal:      General: Abdomen is flat. There is no distension.      Palpations: There is no mass.      Tenderness: There is no abdominal tenderness. There is no guarding or rebound.      Hernia: No hernia is present.   Musculoskeletal:      Cervical back: Normal range of motion and neck supple.   Neurological:      Mental Status: She is alert.         Results Review:   I reviewed the patient's new clinical results.    No visits with results within 90 Day(s) from this visit.   Latest known visit with results is:   Lab on 08/19/2024   Component Date Value Ref Range Status    Glucose 08/19/2024 78  65 - 99 mg/dL Final    BUN 08/19/2024 14  8 - 23 mg/dL Final    Creatinine 08/19/2024 0.61  0.57 - 1.00 mg/dL Final    Sodium 08/19/2024 137  136 - 145 mmol/L Final    Potassium 08/19/2024 4.6  3.5 - 5.2 mmol/L Final    Slight " hemolysis detected by analyzer. Result may be falsely elevated.    Chloride 08/19/2024 100  98 - 107 mmol/L Final    CO2 08/19/2024 24.9  22.0 - 29.0 mmol/L Final    Calcium 08/19/2024 9.8  8.6 - 10.5 mg/dL Final    Total Protein 08/19/2024 7.3  6.0 - 8.5 g/dL Final    Albumin 08/19/2024 4.4  3.5 - 5.2 g/dL Final    ALT (SGPT) 08/19/2024 42 (H)  1 - 33 U/L Final    AST (SGOT) 08/19/2024 37 (H)  1 - 32 U/L Final    Slight hemolysis detected by analyzer. Result may be falsely elevated.    Alkaline Phosphatase 08/19/2024 69  39 - 117 U/L Final    Total Bilirubin 08/19/2024 <0.2  0.0 - 1.2 mg/dL Final    Globulin 08/19/2024 2.9  gm/dL Final    A/G Ratio 08/19/2024 1.5  g/dL Final    BUN/Creatinine Ratio 08/19/2024 23.0  7.0 - 25.0 Final    Anion Gap 08/19/2024 12.1  5.0 - 15.0 mmol/L Final    eGFR 08/19/2024 95.7  >60.0 mL/min/1.73 Final    WBC 08/19/2024 4.93  3.40 - 10.80 10*3/mm3 Final    RBC 08/19/2024 4.21  3.77 - 5.28 10*6/mm3 Final    Hemoglobin 08/19/2024 11.3 (L)  12.0 - 15.9 g/dL Final    Hematocrit 08/19/2024 35.0  34.0 - 46.6 % Final    MCV 08/19/2024 83.1  79.0 - 97.0 fL Final    MCH 08/19/2024 26.8  26.6 - 33.0 pg Final    MCHC 08/19/2024 32.3  31.5 - 35.7 g/dL Final    RDW 08/19/2024 13.1  12.3 - 15.4 % Final    RDW-SD 08/19/2024 38.9  37.0 - 54.0 fl Final    MPV 08/19/2024 10.8  6.0 - 12.0 fL Final    Platelets 08/19/2024 337  140 - 450 10*3/mm3 Final    Neutrophil % 08/19/2024 59.7  42.7 - 76.0 % Final    Lymphocyte % 08/19/2024 29.8  19.6 - 45.3 % Final    Monocyte % 08/19/2024 9.1  5.0 - 12.0 % Final    Eosinophil % 08/19/2024 0.4  0.3 - 6.2 % Final    Basophil % 08/19/2024 0.8  0.0 - 1.5 % Final    Immature Grans % 08/19/2024 0.2  0.0 - 0.5 % Final    Neutrophils, Absolute 08/19/2024 2.94  1.70 - 7.00 10*3/mm3 Final    Lymphocytes, Absolute 08/19/2024 1.47  0.70 - 3.10 10*3/mm3 Final    Monocytes, Absolute 08/19/2024 0.45  0.10 - 0.90 10*3/mm3 Final    Eosinophils, Absolute 08/19/2024 0.02  0.00 -  0.40 10*3/mm3 Final    Basophils, Absolute 08/19/2024 0.04  0.00 - 0.20 10*3/mm3 Final    Immature Grans, Absolute 08/19/2024 0.01  0.00 - 0.05 10*3/mm3 Final    nRBC 08/19/2024 0.0  0.0 - 0.2 /100 WBC Final      No radiology results for the last 90 days.      Dated April 21, 2014 the patient underwent an upper endoscopy which revealed: Report unavailable.  Gastric biopsies revealed reactive gastropathy with focal surface erosion; special stain negative for Helicobacter.  Duodenal biopsies revealed no significant pathologic abnormalities.     Colonoscopy dated 10/16/2019 reveals scant pandiverticulosis.  Internal hemorrhoids.  Colon polyps.  2 were removed, 3 mm in size.  Angiodysplasia in the right colon.  One with a minimal ooze of blood.  Status post thermal ablation therapy using argon plasma coagulator.  Terminal ileum biopsy reveals unremarkable small bowel mucosa.  Random colon biopsy reveals unremarkable colonic mucosa.  Negative for acute and microscopic colitis.  Cecum polyp biopsy reveals unremarkable colonic mucosa.  Rectal polyp biopsy reveals hyperplastic polyp.  Sigmoid colon polyp biopsy reveals lymphoid aggregate.     EGD dated 1/7/2020 reveals erythematous distal esophagitis.  Nonobstructive Schatzki's type ring.  Small sliding hiatal hernia less than 3 cm.  Erythematous erosive gastritis.  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  Status post thermal ablation therapy using argon plasma coagulator.  In the presence of coagulopathy small nonbleeding vascular ectasias can explain anemia.  Second portion of duodenum biopsy reveals small bowel mucosa with no significant pathologic abnormality.  Antrum body and fundus biopsy reveals gastric mucosa with mild reactive gastropathy.  No H. pylori, intestinal metaplasia or dysplasia.     Labs 11/8/2022: Cr 0.86, AST 28, ALT 17, alk phos 71, bili 0.3, Hgb 8.4, HCT 28.1, MCV 76, platelets 381,000, total cholesterol 212, TG 59, LDL 97, ferritin  9, vit B12 1497, folate >20,  iron 23, TIBC 457, iron saturation 5%.      EGD and colonoscopy by Dr. Daugherty 2/15/2023  - Normal oropharynx.  - Z-line regular, 40 cm from the incisors.  - No gross lesions in the entire esophagus.  - A few gastric polyps. FGPs, previously biopsied  - Erythematous mucosa in the antrum and prepyloric region of the stomach. Biopsied.  - Three non-bleeding angioectasias in the duodenum. Treated with argon plasma coagulation (APC).  - Normal third portion of the duodenum.  - Decreased sphincter tone found on digital rectal exam.  - Diverticulosis in the sigmoid colon, in the descending colon, in the ascending colon and in the cecum.  - Tortuous colon.  - One 2 to 3 mm polyp in the proximal ascending colon, removed with a cold snare. Resected and retrieved.  - One 2 to 3 mm polyp in the rectum, removed with a cold snare. Resected and retrieved.  - The examined portion of the ileum was normal. Biopsied for anemia  - No lower GI AVMs identified  Pathology DIAGNOSIS:   A.     GASTRIC BIOPSY:   Gastric antral type mucosa with reactive (chemical) gastropathy   Negative H. pylori, metaplasia or dysplasia   B.     TERMINAL ILEUM BIOPSY:   Intestinal mucosa without pathologic alterations   C.     ASCENDING COLON POLYP:   Colonic type mucosa with no significant histopathologic abnormalities   Negative for significant inflammation or atypia   D.     RECTOSIGMOID COLON, POLYP:   Fecal material only   No colon mucosa present     Pillcam 3/2/2023  Single medium sized nonbleeding AVM noted possibly in the proximal ileum at 1 hour 15 minutes 59seconds. No proximal small bowel AVMs identified.  Apart from that there was a large nonbleeding AVM in the colon which appears to be located on the left side colon.     Repeat colonoscopy by Dr. Daugherty 3/28/2023  - Diverticulosis in the sigmoid colon and in the proximal ascending colon.  - A single non-bleeding colonic angioectasia. Treated with argon beam  coagulation.  - A single non-bleeding colonic angioectasia. Treatment not successful. Treated with argon plasma coagulation (APC). Clip (MR conditional) was placed. Clip : [employer].  - The examined portion of the ileum was normal.  No specimens collected.    Assessment / Plan      1. Iron deficiency anemia due to chronic blood loss  2. AVM (arteriovenous malformation) of colon  3. Angiodysplasia of small intestine  4.  Elevated liver enzymes  3/4/2025  Patient is clinically doing well without any history suggestive of GI bleeding.  Her last lab work did reveal elevation in the liver enzymes.  Patient is nonalcoholic.  Last lab work done on 8/19/2024 revealed normal CMP except elevated liver enzymes with ALT 42 AST 37 T. bili less than 0.2 alkaline phosphatase 69 and albumin 4.4.  Her hemoglobin was 11.3 WBC 4.9 and platelet count of 337,000.  Her hemoglobin improved now to 11.3 from 8.2 last year.    Unclear her elevation in the liver enzymes is new or she had any prior elevation as there is no other lab work to compare.  Also possible that patient may have had some viral illness during this lab work.    We will repeat the CBC CMP today and if it is still abnormal patient needs a full liver workup  We will also get iron studies folic acid and vitamin B12 level for further evaluation of her anemia  Continue iron pills p.o. daily  Continue aspirin Plavix  As there is no significant reflux symptoms we will change her Protonix to as needed on demand    4/27/2023  In Dec 2022, her Hgb dropped from 10 to 8 g/dL. She had EGD, colonoscopy then pillcam and a repeat colonoscopy since last visit. EGD and colonoscopy 2/2023 showed 2 AVMs in the duodenum, none in the colon. PillCam showed right colon AVM and proximal ileum AVM. Repeat colonoscopy, had clip placement at single right colonic AVM. Previous EGD in 2020 she had vascular ectasia in stomach and duodenum. Previous colonoscopy in 2019, she had  angiodysplasia in the right colon. Has a personal history of colon polyps.      Prior history  5. History of colon polyps  She has a prior history of colon polyps.  Last colonoscopy on 3/28/2023 did not reveal any colon polyps. Will consider colonoscopy in 2028          Follow Up:   No follow-ups on file.    Ame Daugherty MD  [facility] [place]  3/4/2025  15:26 EST     Please note that portions of this note may have been completed with a voice recognition program.

## 2025-03-05 LAB
ALBUMIN SERPL-MCNC: 4.4 G/DL (ref 3.5–5.2)
ALBUMIN/GLOB SERPL: 1.5 G/DL
ALP SERPL-CCNC: 70 U/L (ref 39–117)
ALT SERPL W P-5'-P-CCNC: 19 U/L (ref 1–33)
ANION GAP SERPL CALCULATED.3IONS-SCNC: 14.7 MMOL/L (ref 5–15)
AST SERPL-CCNC: 23 U/L (ref 1–32)
BILIRUB SERPL-MCNC: 0.4 MG/DL (ref 0–1.2)
BUN SERPL-MCNC: 11 MG/DL (ref 8–23)
BUN/CREAT SERPL: 17.7 (ref 7–25)
CALCIUM SPEC-SCNC: 9.8 MG/DL (ref 8.6–10.5)
CHLORIDE SERPL-SCNC: 103 MMOL/L (ref 98–107)
CO2 SERPL-SCNC: 23.3 MMOL/L (ref 22–29)
CREAT SERPL-MCNC: 0.62 MG/DL (ref 0.57–1)
EGFRCR SERPLBLD CKD-EPI 2021: 94.8 ML/MIN/1.73
FERRITIN SERPL-MCNC: 77.5 NG/ML (ref 13–150)
GLOBULIN UR ELPH-MCNC: 2.9 GM/DL
GLUCOSE SERPL-MCNC: 87 MG/DL (ref 65–99)
IRON 24H UR-MRATE: 53 MCG/DL (ref 37–145)
IRON SATN MFR SERPL: 13 % (ref 20–50)
POTASSIUM SERPL-SCNC: 3.8 MMOL/L (ref 3.5–5.2)
PROT SERPL-MCNC: 7.3 G/DL (ref 6–8.5)
SODIUM SERPL-SCNC: 141 MMOL/L (ref 136–145)
TIBC SERPL-MCNC: 420 MCG/DL (ref 298–536)
TRANSFERRIN SERPL-MCNC: 282 MG/DL (ref 200–360)

## 2025-03-06 ENCOUNTER — TELEPHONE (OUTPATIENT)
Dept: GASTROENTEROLOGY | Facility: CLINIC | Age: 75
End: 2025-03-06
Payer: MEDICARE

## 2025-03-06 NOTE — TELEPHONE ENCOUNTER
----- Message from Melvina GTZ sent at 3/6/2025 10:39 AM EST -----  LM for patient to call back.  ----- Message -----  From: Stephany Weinstein MA  Sent: 3/6/2025   8:01 AM EST  To: Melvina Walls MA      ----- Message -----  From: Ame Daugherty MD  Sent: 3/5/2025   6:57 PM EST  To: [facility]      Let her know that her liver numbers normalized now.    Also her hemoglobin is stable and normal however still has some iron deficiency and will continue her iron pills until her next visit  ----- Message -----  From: Lab, Background User  Sent: 3/4/2025  10:49 PM EST  To: Ame Daugherty MD

## 2025-03-18 ENCOUNTER — TRANSCRIBE ORDERS (OUTPATIENT)
Dept: ADMINISTRATIVE | Facility: HOSPITAL | Age: 75
End: 2025-03-18
Payer: MEDICARE

## 2025-03-18 DIAGNOSIS — Z12.31 SCREENING MAMMOGRAM FOR BREAST CANCER: Primary | ICD-10-CM

## 2025-03-19 ENCOUNTER — PREP FOR SURGERY (OUTPATIENT)
Dept: OTHER | Facility: HOSPITAL | Age: 75
End: 2025-03-19
Payer: MEDICARE

## 2025-03-19 DIAGNOSIS — H25.11 NUCLEAR SCLEROTIC CATARACT OF RIGHT EYE: Primary | ICD-10-CM

## 2025-03-19 RX ORDER — TETRACAINE HYDROCHLORIDE 5 MG/ML
1 SOLUTION OPHTHALMIC SEE ADMIN INSTRUCTIONS
Status: CANCELLED | OUTPATIENT
Start: 2025-03-19

## 2025-03-19 RX ORDER — SODIUM CHLORIDE 0.9 % (FLUSH) 0.9 %
1-10 SYRINGE (ML) INJECTION AS NEEDED
Status: CANCELLED | OUTPATIENT
Start: 2025-03-19

## 2025-03-19 RX ORDER — PREDNISOLONE ACETATE 10 MG/ML
1 SUSPENSION/ DROPS OPHTHALMIC SEE ADMIN INSTRUCTIONS
Status: CANCELLED | OUTPATIENT
Start: 2025-03-19

## 2025-03-19 RX ORDER — SODIUM CHLORIDE 0.9 % (FLUSH) 0.9 %
10 SYRINGE (ML) INJECTION EVERY 12 HOURS SCHEDULED
Status: CANCELLED | OUTPATIENT
Start: 2025-03-19

## 2025-03-19 RX ORDER — CYCLOPENT/TROPIC/PHEN/KETR/WAT 1%-1%-2.5%
1 DROPS (EA) OPHTHALMIC (EYE)
Status: CANCELLED | OUTPATIENT
Start: 2025-03-19 | End: 2025-03-19

## 2025-03-24 NOTE — PRE-PROCEDURE INSTRUCTIONS
PAT phone history completed with patient for upcoming procedure on 3/27/25 with Dr. Padilla.    PAT PASS reviewed with patient and they verbalize understanding of the following:     Do not eat or drink anything after midnight the night before procedure unless otherwise instructed by physician/surgeon's office, this includes no gum, candy, mints, tobacco products or e-cigarettes.  Do not shave the area to be operated on at least 48 hours prior to procedure.  Do not wear makeup, lotion, hair products, or nail polish.  Do not wear any jewelry and remove all piercings.  Do not wear any adhesive if you wear dentures.  Do not wear contacts; bring in glasses if needed.  Only take medications on the morning of procedure as instructed by PAT nurse per anesthesia guidelines or as instructed by physician's office.  If you are on any blood thinners reach out to the physician/surgeon's office for instructions on when/if they will need to be stopped prior to procedure.  Bring in picture ID and insurance card, advanced directive copies if applicable, CPAP/BIPAP/Inhalers if indicated morning of procedure, leave any other valuables at home.  Ensure you have arranged for someone to drive you home the day of your procedure and someone to care for you at home afterwards. It is recommended that you do not drive, drink alcohol, or make any major legal decisions for at least 24 hours after your procedure is complete.  ERAS instructions given unless otherwise instructed per surgeon's orders.    Instructions given on hospital entrance and registration location.

## 2025-03-27 ENCOUNTER — ANESTHESIA (OUTPATIENT)
Dept: PERIOP | Facility: HOSPITAL | Age: 75
End: 2025-03-27
Payer: MEDICARE

## 2025-03-27 ENCOUNTER — HOSPITAL ENCOUNTER (OUTPATIENT)
Facility: HOSPITAL | Age: 75
Setting detail: HOSPITAL OUTPATIENT SURGERY
Discharge: HOME | End: 2025-03-27
Attending: OPHTHALMOLOGY | Admitting: OPHTHALMOLOGY
Payer: MEDICARE

## 2025-03-27 ENCOUNTER — ANESTHESIA EVENT (OUTPATIENT)
Dept: PERIOP | Facility: HOSPITAL | Age: 75
End: 2025-03-27
Payer: MEDICARE

## 2025-03-27 VITALS
DIASTOLIC BLOOD PRESSURE: 65 MMHG | OXYGEN SATURATION: 94 % | SYSTOLIC BLOOD PRESSURE: 101 MMHG | WEIGHT: 132 LBS | HEART RATE: 59 BPM | BODY MASS INDEX: 23.39 KG/M2 | TEMPERATURE: 97.8 F | RESPIRATION RATE: 16 BRPM | HEIGHT: 63 IN

## 2025-03-27 DIAGNOSIS — H25.11 NUCLEAR SCLEROTIC CATARACT OF RIGHT EYE: ICD-10-CM

## 2025-03-27 PROCEDURE — 25010000002 FENTANYL CITRATE (PF) 50 MCG/ML SOLUTION PREFILLED SYRINGE: Performed by: NURSE ANESTHETIST, CERTIFIED REGISTERED

## 2025-03-27 PROCEDURE — 25010000002 LIDOCAINE HCL (PF) 4 % SOLUTION: Performed by: OPHTHALMOLOGY

## 2025-03-27 PROCEDURE — 25010000002 MIDAZOLAM PER 1MG: Performed by: NURSE ANESTHETIST, CERTIFIED REGISTERED

## 2025-03-27 PROCEDURE — V2632 POST CHMBR INTRAOCULAR LENS: HCPCS | Performed by: OPHTHALMOLOGY

## 2025-03-27 DEVICE — LENS MONOFOCAL IQ CC60WF170: Type: IMPLANTABLE DEVICE | Site: POSTERIOR CHAMBER | Status: FUNCTIONAL

## 2025-03-27 RX ORDER — LIDOCAINE HYDROCHLORIDE 40 MG/ML
INJECTION, SOLUTION RETROBULBAR AS NEEDED
Status: DISCONTINUED | OUTPATIENT
Start: 2025-03-27 | End: 2025-03-27 | Stop reason: HOSPADM

## 2025-03-27 RX ORDER — FENTANYL CITRATE 50 UG/ML
INJECTION, SOLUTION INTRAMUSCULAR; INTRAVENOUS AS NEEDED
Status: DISCONTINUED | OUTPATIENT
Start: 2025-03-27 | End: 2025-03-27 | Stop reason: SURG

## 2025-03-27 RX ORDER — PREDNISOLONE ACETATE 10 MG/ML
SUSPENSION/ DROPS OPHTHALMIC AS NEEDED
Status: DISCONTINUED | OUTPATIENT
Start: 2025-03-27 | End: 2025-03-27 | Stop reason: HOSPADM

## 2025-03-27 RX ORDER — POVIDONE-IODINE 5 %
SOLUTION, NON-ORAL OPHTHALMIC (EYE) AS NEEDED
Status: DISCONTINUED | OUTPATIENT
Start: 2025-03-27 | End: 2025-03-27 | Stop reason: HOSPADM

## 2025-03-27 RX ORDER — SODIUM CHLORIDE 0.9 % (FLUSH) 0.9 %
10 SYRINGE (ML) INJECTION EVERY 12 HOURS SCHEDULED
Status: DISCONTINUED | OUTPATIENT
Start: 2025-03-27 | End: 2025-03-27 | Stop reason: HOSPADM

## 2025-03-27 RX ORDER — NEOMYCIN SULFATE, POLYMYXIN B SULFATE, AND DEXAMETHASONE 3.5; 10000; 1 MG/G; [USP'U]/G; MG/G
OINTMENT OPHTHALMIC AS NEEDED
Status: DISCONTINUED | OUTPATIENT
Start: 2025-03-27 | End: 2025-03-27 | Stop reason: HOSPADM

## 2025-03-27 RX ORDER — MIDAZOLAM HYDROCHLORIDE 2 MG/2ML
INJECTION, SOLUTION INTRAMUSCULAR; INTRAVENOUS AS NEEDED
Status: DISCONTINUED | OUTPATIENT
Start: 2025-03-27 | End: 2025-03-27 | Stop reason: SURG

## 2025-03-27 RX ORDER — BALANCED SALT SOLUTION 6.4; .75; .48; .3; 3.9; 1.7 MG/ML; MG/ML; MG/ML; MG/ML; MG/ML; MG/ML
SOLUTION OPHTHALMIC AS NEEDED
Status: DISCONTINUED | OUTPATIENT
Start: 2025-03-27 | End: 2025-03-27 | Stop reason: HOSPADM

## 2025-03-27 RX ORDER — SODIUM CHLORIDE 0.9 % (FLUSH) 0.9 %
1-10 SYRINGE (ML) INJECTION AS NEEDED
Status: DISCONTINUED | OUTPATIENT
Start: 2025-03-27 | End: 2025-03-27 | Stop reason: HOSPADM

## 2025-03-27 RX ORDER — TETRACAINE HYDROCHLORIDE 5 MG/ML
SOLUTION OPHTHALMIC AS NEEDED
Status: DISCONTINUED | OUTPATIENT
Start: 2025-03-27 | End: 2025-03-27 | Stop reason: HOSPADM

## 2025-03-27 RX ORDER — CYCLOPENT/TROPIC/PHEN/KETR/WAT 1%-1%-2.5%
1 DROPS (EA) OPHTHALMIC (EYE)
Status: COMPLETED | OUTPATIENT
Start: 2025-03-27 | End: 2025-03-27

## 2025-03-27 RX ORDER — MOXIFLOXACIN IN NACL,ISO-OS/PF 1.6 MG/ML
SYRINGE (ML) INTRAOCULAR AS NEEDED
Status: DISCONTINUED | OUTPATIENT
Start: 2025-03-27 | End: 2025-03-27 | Stop reason: HOSPADM

## 2025-03-27 RX ORDER — TETRACAINE HYDROCHLORIDE 5 MG/ML
1 SOLUTION OPHTHALMIC SEE ADMIN INSTRUCTIONS
Status: COMPLETED | OUTPATIENT
Start: 2025-03-27 | End: 2025-03-27

## 2025-03-27 RX ORDER — ACETAZOLAMIDE 500 MG/1
500 CAPSULE, EXTENDED RELEASE ORAL ONCE
Status: COMPLETED | OUTPATIENT
Start: 2025-03-27 | End: 2025-03-27

## 2025-03-27 RX ORDER — PREDNISOLONE ACETATE 10 MG/ML
1 SUSPENSION/ DROPS OPHTHALMIC 4 TIMES DAILY
Start: 2025-03-27

## 2025-03-27 RX ORDER — PREDNISOLONE ACETATE 10 MG/ML
1 SUSPENSION/ DROPS OPHTHALMIC SEE ADMIN INSTRUCTIONS
Status: DISCONTINUED | OUTPATIENT
Start: 2025-03-27 | End: 2025-03-27 | Stop reason: HOSPADM

## 2025-03-27 RX ADMIN — Medication 1 DROP: at 13:02

## 2025-03-27 RX ADMIN — TETRACAINE HYDROCHLORIDE 1 DROP: 5 SOLUTION OPHTHALMIC at 13:00

## 2025-03-27 RX ADMIN — MIDAZOLAM HYDROCHLORIDE 2 MG: 1 INJECTION, SOLUTION INTRAMUSCULAR; INTRAVENOUS at 13:54

## 2025-03-27 RX ADMIN — FENTANYL CITRATE 50 MCG: 50 INJECTION, SOLUTION INTRAMUSCULAR; INTRAVENOUS at 13:54

## 2025-03-27 RX ADMIN — Medication 1 DROP: at 13:05

## 2025-03-27 RX ADMIN — ACETAZOLAMIDE 500 MG: 500 CAPSULE, EXTENDED RELEASE ORAL at 14:35

## 2025-03-27 RX ADMIN — Medication 1 DROP: at 13:07

## 2025-03-27 RX ADMIN — TETRACAINE HYDROCHLORIDE 1 DROP: 5 SOLUTION OPHTHALMIC at 13:01

## 2025-03-27 NOTE — ANESTHESIA POSTPROCEDURE EVALUATION
Patient: Annalise Corona    Procedure Summary       Date: 03/27/25 Room / Location: [facility] OR 3 /  [facility]    Anesthesia Start: 1351 Anesthesia Stop: 1403    Procedure: CATARACT PHACO EXTRACTION WITH INTRAOCULAR LENS IMPLANT RIGHT (Right: Eye) Diagnosis:       Nuclear sclerotic cataract of right eye      (Nuclear sclerotic cataract of right eye [H25.11])    Surgeons: Terry Padilla MD Provider: Segun Bravo CRNA    Anesthesia Type: MAC ASA Status: 3            Anesthesia Type: MAC    Vitals  No vitals data found for the desired time range.          Post Anesthesia Care and Evaluation    Patient location during evaluation: PHASE II  Patient participation: complete - patient participated  Level of consciousness: awake and alert  Pain score: 1  Pain management: satisfactory to patient    Airway patency: patent  Anesthetic complications: No anesthetic complications  PONV Status: none  Cardiovascular status: acceptable and stable  Respiratory status: acceptable and spontaneous ventilation  Hydration status: acceptable    Comments: Vitals signs as noted in nursing documentation as per protocol.

## 2025-03-27 NOTE — DISCHARGE INSTRUCTIONS
[facility], [facility] [place]  [address] [place] [address]  (P): [phone]           (F): [phone]    Annalise Corona  PATIENT NAME:__________________________________    Right Eye    POST OPERATIVE INSTRUCTIONS    You have received anesthesia today. DO NOT drive, drink alcohol, sign legal documents.   After surgery, your eye will not hurt. It may feel scratchy, sticky or uncomfortable. Your eye will be sensitive to light.  Most people see better 1-3 days after the procedure, but it could take 3 weeks to get the full benefits and reach your visual potential. If your vision is blurry for a few days it is normal, and means you may have swelling outside or inside the eye. For some patients, a bubble is placed and there will be blurriness.   You should receive a post-op kit with tape and an eye shield. Wear the shield for the first 3 nights after surgery to keep you from rubbing the eye.  Most people are able to return to their normal routine 1-3 days after surgery, however, due to the brain adjusting to your new vision you may have trouble judging distances and want to be careful when driving and going up and downstairs.   You can shower and wash your hair the day after surgery. Keep water, shampoo, hair spray and shaving lotion out of the eye, especially for the first week.  During the first week, you should AVOID:   Rubbing or putting pressure on your eye.  Eye make-up, face cream or lotion, hair coloring or perms  Strenuous activities, such as running or lifting weights, as to avoid sweat from getting in the eye. Avoid swimming, hot tubs, fumes or nathaniel conditions.   Keep your head above your heart (no hanging the head down for periods of time).  Some discomfort and blurred vision after surgery are normal, but if you have any unusual pain, swelling, bleeding or sudden decrease in vision, contact our office immediately. Emergency assistance is available at any time by calling:    Dr. Terry Antonio Dr.  Charanjit ALANISKasia LeahyPadilla    [phone]    If unable to reach call [facility] @ [phone]      POST OPERATIVE DROP INSTRUCTIONS  You have been prescribed eye drops to use after surgery, please follow these instructions:  PLACE A CRESENCIO IN THE DAY COLUMN EACH TIME YOU USE TO KEEP ON SCHEDULE. WAIT 5 MINUTES IN BETWEEN DROPS            Prednisolone (PINK TOP) SHAKE WELL BEFORE USE   GIVEN TO YOU BY THE HOSPITAL    WEEK 1-USE 4  (FOUR) TIMES A DAY DAY 1   DAY 2 DAY 3 DAY 4 DAY 5 DAY 6 DAY 7     WEEK 2-USE 3 (THREE)  TIMES A DAY DAY 1 DAY 2 DAY 3 DAY 4 DAY 5 DAY 6 DAY 7     WEEK 3-USE 2 (TWO) TIMES A DAY DAY 1 DAY 2 DAY 3 DAY 4 DAY 5 DAY 6 DAY 7     WEEK 4-USE 1 (ONE)TIME A DAY DAY 1 DAY 2 DAY 3 DAY 4 DAY 5 DAY 6 DAY 7         Ketorolac (GRAY TOP) PRESCRIBED TO YOUR PHARMACY    WEEK 1-USE 4  (FOUR) TIMES A DAY DAY 1   DAY 2 DAY 3 DAY 4 DAY 5 DAY 6 DAY 7     WEEK 2-USE 3 (THREE)  TIMES A DAY DAY 1 DAY 2 DAY 3 DAY 4 DAY 5 DAY 6 DAY 7     WEEK 3-USE 2 (TWO) TIMES A DAY DAY 1 DAY 2 DAY 3 DAY 4 DAY 5 DAY 6 DAY 7     WEEK 4-USE 1 (ONE)TIME A DAY DAY 1 DAY 2 DAY 3 DAY 4 DAY 5 DAY 6 DAY 7       Moxifloxacin (TAN TOP) PRESCRIBED TO YOUR PHARMACY    WEEK 1-USE 4  (FOUR) TIMES A DAY DAY 1   DAY 2 DAY 3 DAY 4 DAY 5 DAY 6 DAY 7     Rest today  No pushing,pulling,tugging,heavy lifting, or strenuous activity   No major decision making,driving,or drinking alcoholic beverages for 24 hours due to the sedation you received  Always use good hand hygiene/washing technique  No driving on pain medication.  To assist you in voiding:  Drink plenty of fluids  Listen to running water while attempting to void.    If you are unable to urinate and you have an uncomfortable urge to void or it has been   6 hours since you were discharged, return to the Emergency Room

## 2025-03-27 NOTE — H&P
[facility]         History and Physical    Patient Name: Annalise Corona  MRN: [id number]  : 1950  Gender: female     HPI: Patient complaint of PPLOV Right eye diagnosed with cataract. Patient requests PHACO PCIOL for Increase of VA/ADL.    History:    Past Medical History:   Diagnosis Date    Arthritis     Body piercing     Both ears    Borderline diabetes     Colon polyp 10/16/2019    Coronary artery disease 2020    Dr. Osman     Elevated cholesterol     History of exercise stress test     Hyperlipidemia     Hypertension     PVD (peripheral vascular disease)     12 years ago     Stroke     TIA - pt states greater than 10 years ago    Wears glasses        Past Surgical History:   Procedure Laterality Date    BELOW KNEE LEG AMPUTATION Left     BREAST BIOPSY Left         CARDIOVASCULAR STRESS TEST      COLONOSCOPY N/A 10/16/2019    Procedure: COLONOSCOPY WITH ARGON THERMAL ABLATION, COLD BIOPSY POLYPECTOMIES, AND BIOPSIES;  Surgeon: Kirit Gilmore MD;  Location: [facility] ENDOSCOPY;  Service: Gastroenterology    COLONOSCOPY N/A 02/15/2023    Procedure: COLONOSCOPY with biopsy and polypectomy ;  Surgeon: Ame Daugherty MD;  Location: [facility] ENDOSCOPY;  Service: Gastroenterology;  Laterality: N/A;    COLONOSCOPY N/A 2023    Procedure: COLONOSCOPY WITH AVM COAGULATION OF ASCENDING COLON AND HEPATIC FLEXURE AND RESOLUTION CLIP PLACEMENT X1;  Surgeon: Ame Daugherty MD;  Location: [facility] ENDOSCOPY;  Service: Gastroenterology;  Laterality: N/A;    CORONARY ANGIOPLASTY WITH STENT PLACEMENT      , times two stents    ENDOSCOPY N/A 2020    Procedure: ESOPHAGOGASTRODUODENOSCOPY;  Surgeon: Kirit Gilmore MD;  Location: [facility] ENDOSCOPY;  Service: Gastroenterology    ENDOSCOPY N/A 02/15/2023    Procedure: ESOPHAGOGASTRODUODENOSCOPY WITH BIOPSY and argon beam coagulation;  Surgeon: Ame Daugherty MD;  Location: [facility] ENDOSCOPY;  Service:  Gastroenterology;  Laterality: N/A;    HYSTERECTOMY      INTERVENTIONAL RADIOLOGY PROCEDURE N/A 2019    Procedure: Abdominal Aortagram with Runoff;  Surgeon: Charan Osman MD;  Location: [facility] INVASIVE LOCATION;  Service: Cardiovascular    OOPHORECTOMY         Social History     Socioeconomic History    Marital status:    Tobacco Use    Smoking status: Former     Current packs/day: 0.00     Types: Cigarettes     Quit date: 1/3/2013     Years since quittin.2    Smokeless tobacco: Never   Vaping Use    Vaping status: Never Used   Substance and Sexual Activity    Alcohol use: Yes     Alcohol/week: 6.0 standard drinks of alcohol     Types: 6 Cans of beer per week    Drug use: No    Sexual activity: Defer       Family History   Problem Relation Age of Onset    Hypertension Mother     Colon cancer Father     Lung cancer Father     Breast cancer Neg Hx        Prior to Admission Medications:  Medications Prior to Admission   Medication Sig Dispense Refill Last Dose/Taking    amLODIPine (NORVASC) 10 MG tablet Take 1 tablet by mouth Every Morning.   Taking    aspirin 81 MG EC tablet Take 1 tablet by mouth Daily.   Taking    calcium carbonate (OS-PASCALE) 600 MG tablet Take 1 tablet by mouth 2 (Two) Times a Day With Meals.   Taking    clopidogrel (PLAVIX) 75 MG tablet Take 1 tablet by mouth Daily.   Taking    ezetimibe (ZETIA) 10 MG tablet Take 1 tablet by mouth Daily.   Taking    ferrous sulfate 324 (65 Fe) MG tablet delayed-release EC tablet Take 1 tablet by mouth Daily With Breakfast.   Taking    gabapentin (NEURONTIN) 400 MG capsule Take 1 capsule by mouth 3 (Three) Times a Day.   Taking    losartan (COZAAR) 25 MG tablet Take 1 tablet by mouth 2 (Two) Times a Day.   Taking    Multiple Vitamin (MULTIVITAMINS PO) Take 1 tablet by mouth Daily.   Taking    oxybutynin XL (DITROPAN-XL) 5 MG 24 hr tablet Take 1 tablet by mouth Daily.   Taking    traZODone (DESYREL) 100 MG tablet Take 1 tablet by mouth  At Night As Needed.   Taking As Needed    pantoprazole (PROTONIX) 40 MG EC tablet Take 1 tablet by mouth Daily. (Patient not taking: Reported on 3/24/2025)   Not Taking       Allergies:  No Known Allergies     Vitals: Temp:  [97.5 °F (36.4 °C)] 97.5 °F (36.4 °C)  Heart Rate:  [59] 59  Resp:  [16] 16  BP: (151)/(72) 151/72    Review of Systems:   Within Normal Limits Abnormal   HEENT [x]    []     Cardiovascular [x]   []     Gastrointestinal [x]   []     Genitourinary [x]   []     Neurologic [x]   []     Pulmonary [x]   []       Physical Exam:   Within Normal Limits Abnormal   HEENT [x]    []     Heart [x]   []     Lungs [x]   []     Abdomen [x]   []     Extremities [x]   []       Impression: Right nuclear sclerotic cataract.     Plan: CATARACT PHACO EXTRACTION WITH INTRAOCULAR LENS IMPLANT RIGHT (Right)     Terry Padilla MD  3/27/2025

## 2025-03-27 NOTE — OP NOTE
OPERATIVE NOTE    Date of Procedure: 3/27/2025  Patient Name: Annalise Corona  Patient MRN: [id number]  YOB: 1950     Preoperative Diagnosis: Right nuclear sclerotic cataract.     Postoperative Diagnosis: Right nuclear sclerotic cataract.     Procedure Performed: Phacoemulsification with implantation of a foldable posterior chamber intraocular lens, Right eye.     Surgeon: Terry Padilla MD    Anesthesia:  Monitored Anesthesia Care (MAC)      Brief History and Indication: The patient presents with a history of past progressive loss of vision.  Patient was diagnosed with cataract and requests removal for increased ability to read and see.     Operation Description: The patient was taken to the OR and prepped and draped in the usual sterile ophthalmic fashion. A lid speculum was placed in the eye.  A #75 blade was then used to make a stab incision two o’clock hours from the intended temporal clear cornea groove. The anterior chamber was then inflated with a Viscoelastic. A metal microkeratome blade was then used to enter the anterior chamber at the temporal clear cornea site. A three level tunnel incision was made. A curvilinear capsulorrhexis was then performed with a bent cystotome needle and capsulorrhexis forceps.  BSS on a 30 gauge bent cannula was used to hydro-dissect, and hydro-delineate the lens. Good fluid waves and hydro-delineation were noted. Phacoemulsification was then used to remove nuclear material without complications. The residual cortical and lenticular material was then removed with irrigation and aspiration. Viscoelastics were then used to inflate the bag in a soft shell technique. A PCIOL was injected into the bag. Post-implantation, there were no rents or tears in the bag and the lens was noted to be stable and centered. The residual Viscoelastic was then removed with irrigation and aspiration.  The wound was checked and found to be without leaks. Therefore a Polydex ointment  and one drop of Durezol eye drop was placed in the eye.     Implant Information:   Implant Name Type Inv. Item Serial No.  Lot No. LRB No. Used Action   LENS MONOFOCAL IQ [id number] - [id number] - [id number] Implant LENS MONOFOCAL IQ [id number] [id number]  Right 1 Implanted       Complications: None    Estimated Blood Loss:  Less than 1 cc.       []   Changed to complex procedure due to: []  hypermature, white cataract. Blue dye was used. []   small iris. A Malyugin Ring was used.    Discharge and Condition  The patient was transported to same day surgery in excellent condition and scheduled for follow-up tomorrow morning. The patient was given instructions on use of eye drops for the operative eye and was specifically instructed to call Dr. Padilla at his office or home for any nausea, vomiting, headache, decreased visual acuity, or pain, or if the patient had any general concerns.    Terry Padilla MD  3/27/2025

## 2025-03-27 NOTE — ANESTHESIA PREPROCEDURE EVALUATION
Anesthesia Evaluation     Patient summary reviewed and Nursing notes reviewed   no history of anesthetic complications:   NPO Solid Status: > 8 hours  NPO Liquid Status: > 2 hours           Airway   Mallampati: II  TM distance: >3 FB  Neck ROM: full  Possible difficult intubation  Dental - normal exam     Pulmonary - normal exam   (+) a smoker Former,  Cardiovascular - normal exam    PT is on anticoagulation therapy  Rhythm: regular  Rate: normal    (+) hypertension 2 medications or greater, CAD, PVD, hyperlipidemia      Neuro/Psych  (+) CVA  GI/Hepatic/Renal/Endo    (+) diabetes mellitus type 2    Musculoskeletal     Abdominal    Substance History   (+) alcohol use     OB/GYN negative ob/gyn ROS         Other   arthritis, blood dyscrasia anemia,     ROS/Med Hx Other: EF 55%                Anesthesia Plan    ASA 3     MAC     (Risks and benefits discussed including risk of aspiration, recall and dental damage. All patient questions answered.    Will continue with plan of care.)  intravenous induction     Anesthetic plan, risks, benefits, and alternatives have been provided, discussed and informed consent has been obtained with: patient.  Pre-procedure education provided  Plan discussed with CRNA.      CODE STATUS:

## 2025-04-02 ENCOUNTER — PREP FOR SURGERY (OUTPATIENT)
Dept: OTHER | Facility: HOSPITAL | Age: 75
End: 2025-04-02
Payer: MEDICARE

## 2025-04-02 DIAGNOSIS — H25.12 NUCLEAR SCLEROTIC CATARACT OF LEFT EYE: Primary | ICD-10-CM

## 2025-04-02 RX ORDER — TETRACAINE HYDROCHLORIDE 5 MG/ML
1 SOLUTION OPHTHALMIC SEE ADMIN INSTRUCTIONS
Status: CANCELLED | OUTPATIENT
Start: 2025-04-02

## 2025-04-02 RX ORDER — SODIUM CHLORIDE 0.9 % (FLUSH) 0.9 %
10 SYRINGE (ML) INJECTION EVERY 12 HOURS SCHEDULED
Status: CANCELLED | OUTPATIENT
Start: 2025-04-02

## 2025-04-02 RX ORDER — PREDNISOLONE ACETATE 10 MG/ML
1 SUSPENSION/ DROPS OPHTHALMIC SEE ADMIN INSTRUCTIONS
Status: CANCELLED | OUTPATIENT
Start: 2025-04-02

## 2025-04-02 RX ORDER — CYCLOPENT/TROPIC/PHEN/KETR/WAT 1%-1%-2.5%
1 DROPS (EA) OPHTHALMIC (EYE)
Status: CANCELLED | OUTPATIENT
Start: 2025-04-02 | End: 2025-04-02

## 2025-04-02 RX ORDER — SODIUM CHLORIDE 0.9 % (FLUSH) 0.9 %
1-10 SYRINGE (ML) INJECTION AS NEEDED
Status: CANCELLED | OUTPATIENT
Start: 2025-04-02

## 2025-04-07 NOTE — PRE-PROCEDURE INSTRUCTIONS
PAT phone call completed with pt for upcoming procedure on 4/10/25. Pt verbalized understanding of instructions given prior to procedure on 3/27/25 . Pt denies any changes to medical hx or medications since procedure.  Medication instructions given to pt by RN per anesthesia protocol.  Pt referred back to surgeon for further instructions if he/she is on any blood thinners. ERAS instructions given to patient unless otherwise instructed per surgeon's orders.

## 2025-04-09 ENCOUNTER — ANESTHESIA EVENT (OUTPATIENT)
Dept: PERIOP | Facility: HOSPITAL | Age: 75
End: 2025-04-09
Payer: MEDICARE

## 2025-04-09 NOTE — ANESTHESIA PREPROCEDURE EVALUATION
Anesthesia Evaluation     Patient summary reviewed and Nursing notes reviewed   no history of anesthetic complications:   NPO Solid Status: > 8 hours  NPO Liquid Status: > 2 hours           Airway   Mallampati: II  TM distance: >3 FB  Neck ROM: full  Possible difficult intubation  Dental - normal exam     Pulmonary - normal exam   (+) a smoker Former,  Cardiovascular - normal exam    PT is on anticoagulation therapy  Rhythm: regular  Rate: normal    (+) hypertension 2 medications or greater, CAD, PVD, hyperlipidemia      Neuro/Psych  (+) CVA  GI/Hepatic/Renal/Endo    (+) GERD well controlled, diabetes mellitus type 2    Musculoskeletal     (+) arthralgias, back pain, chronic pain, myalgias  Abdominal    Substance History   (+) alcohol use     OB/GYN negative ob/gyn ROS         Other   arthritis, blood dyscrasia anemia,     ROS/Med Hx Other: EF 55%                Anesthesia Plan    ASA 3     MAC     (Risks and benefits discussed including risk of aspiration, recall and dental damage. All patient questions answered.    Will continue with plan of care.)  intravenous induction     Anesthetic plan, risks, benefits, and alternatives have been provided, discussed and informed consent has been obtained with: patient.  Pre-procedure education provided  Plan discussed with CRNA.      CODE STATUS:

## 2025-04-10 ENCOUNTER — ANESTHESIA (OUTPATIENT)
Dept: PERIOP | Facility: HOSPITAL | Age: 75
End: 2025-04-10
Payer: MEDICARE

## 2025-04-10 ENCOUNTER — HOSPITAL ENCOUNTER (OUTPATIENT)
Facility: HOSPITAL | Age: 75
Setting detail: HOSPITAL OUTPATIENT SURGERY
Discharge: HOME | End: 2025-04-10
Attending: OPHTHALMOLOGY | Admitting: OPHTHALMOLOGY
Payer: MEDICARE

## 2025-04-10 VITALS
SYSTOLIC BLOOD PRESSURE: 134 MMHG | DIASTOLIC BLOOD PRESSURE: 67 MMHG | TEMPERATURE: 97.4 F | HEART RATE: 54 BPM | OXYGEN SATURATION: 94 % | RESPIRATION RATE: 16 BRPM

## 2025-04-10 DIAGNOSIS — H25.12 NUCLEAR SCLEROTIC CATARACT OF LEFT EYE: ICD-10-CM

## 2025-04-10 LAB — GLUCOSE BLDC GLUCOMTR-MCNC: 105 MG/DL (ref 70–130)

## 2025-04-10 PROCEDURE — 25010000002 MIDAZOLAM PER 1MG: Performed by: NURSE ANESTHETIST, CERTIFIED REGISTERED

## 2025-04-10 PROCEDURE — 25010000002 HALOPERIDOL LACTATE PER 5 MG: Performed by: NURSE ANESTHETIST, CERTIFIED REGISTERED

## 2025-04-10 PROCEDURE — V2632 POST CHMBR INTRAOCULAR LENS: HCPCS | Performed by: OPHTHALMOLOGY

## 2025-04-10 PROCEDURE — 82948 REAGENT STRIP/BLOOD GLUCOSE: CPT | Performed by: OPHTHALMOLOGY

## 2025-04-10 PROCEDURE — 25010000002 FENTANYL CITRATE (PF) 50 MCG/ML SOLUTION: Performed by: NURSE ANESTHETIST, CERTIFIED REGISTERED

## 2025-04-10 PROCEDURE — 25010000002 LIDOCAINE HCL (PF) 4 % SOLUTION: Performed by: OPHTHALMOLOGY

## 2025-04-10 DEVICE — LENS MONOFOCAL IQ CC60WF160: Type: IMPLANTABLE DEVICE | Site: POSTERIOR CHAMBER | Status: FUNCTIONAL

## 2025-04-10 RX ORDER — PREDNISOLONE ACETATE 10 MG/ML
1 SUSPENSION/ DROPS OPHTHALMIC SEE ADMIN INSTRUCTIONS
Status: DISCONTINUED | OUTPATIENT
Start: 2025-04-10 | End: 2025-04-10 | Stop reason: HOSPADM

## 2025-04-10 RX ORDER — CYCLOPENT/TROPIC/PHEN/KETR/WAT 1%-1%-2.5%
1 DROPS (EA) OPHTHALMIC (EYE)
Status: COMPLETED | OUTPATIENT
Start: 2025-04-10 | End: 2025-04-10

## 2025-04-10 RX ORDER — SODIUM CHLORIDE 0.9 % (FLUSH) 0.9 %
1-10 SYRINGE (ML) INJECTION AS NEEDED
Status: DISCONTINUED | OUTPATIENT
Start: 2025-04-10 | End: 2025-04-10 | Stop reason: HOSPADM

## 2025-04-10 RX ORDER — PREDNISOLONE ACETATE 10 MG/ML
SUSPENSION/ DROPS OPHTHALMIC AS NEEDED
Status: DISCONTINUED | OUTPATIENT
Start: 2025-04-10 | End: 2025-04-10 | Stop reason: HOSPADM

## 2025-04-10 RX ORDER — FENTANYL CITRATE 50 UG/ML
INJECTION, SOLUTION INTRAMUSCULAR; INTRAVENOUS AS NEEDED
Status: DISCONTINUED | OUTPATIENT
Start: 2025-04-10 | End: 2025-04-10 | Stop reason: SURG

## 2025-04-10 RX ORDER — NEOMYCIN SULFATE, POLYMYXIN B SULFATE, AND DEXAMETHASONE 3.5; 10000; 1 MG/G; [USP'U]/G; MG/G
OINTMENT OPHTHALMIC AS NEEDED
Status: DISCONTINUED | OUTPATIENT
Start: 2025-04-10 | End: 2025-04-10 | Stop reason: HOSPADM

## 2025-04-10 RX ORDER — LIDOCAINE HYDROCHLORIDE 40 MG/ML
INJECTION, SOLUTION RETROBULBAR AS NEEDED
Status: DISCONTINUED | OUTPATIENT
Start: 2025-04-10 | End: 2025-04-10 | Stop reason: HOSPADM

## 2025-04-10 RX ORDER — TETRACAINE HYDROCHLORIDE 5 MG/ML
1 SOLUTION OPHTHALMIC SEE ADMIN INSTRUCTIONS
Status: COMPLETED | OUTPATIENT
Start: 2025-04-10 | End: 2025-04-10

## 2025-04-10 RX ORDER — HALOPERIDOL 5 MG/ML
INJECTION INTRAMUSCULAR AS NEEDED
Status: DISCONTINUED | OUTPATIENT
Start: 2025-04-10 | End: 2025-04-10 | Stop reason: SURG

## 2025-04-10 RX ORDER — TETRACAINE HYDROCHLORIDE 5 MG/ML
SOLUTION OPHTHALMIC AS NEEDED
Status: DISCONTINUED | OUTPATIENT
Start: 2025-04-10 | End: 2025-04-10 | Stop reason: HOSPADM

## 2025-04-10 RX ORDER — PREDNISOLONE ACETATE 10 MG/ML
1 SUSPENSION/ DROPS OPHTHALMIC 4 TIMES DAILY
Start: 2025-04-10

## 2025-04-10 RX ORDER — MOXIFLOXACIN IN NACL,ISO-OS/PF 1.6 MG/ML
SYRINGE (ML) INTRAOCULAR AS NEEDED
Status: DISCONTINUED | OUTPATIENT
Start: 2025-04-10 | End: 2025-04-10 | Stop reason: HOSPADM

## 2025-04-10 RX ORDER — ACETAZOLAMIDE 500 MG/1
500 CAPSULE, EXTENDED RELEASE ORAL ONCE
Status: COMPLETED | OUTPATIENT
Start: 2025-04-10 | End: 2025-04-10

## 2025-04-10 RX ORDER — SODIUM CHLORIDE 0.9 % (FLUSH) 0.9 %
10 SYRINGE (ML) INJECTION EVERY 12 HOURS SCHEDULED
Status: DISCONTINUED | OUTPATIENT
Start: 2025-04-10 | End: 2025-04-10 | Stop reason: HOSPADM

## 2025-04-10 RX ORDER — BALANCED SALT SOLUTION 6.4; .75; .48; .3; 3.9; 1.7 MG/ML; MG/ML; MG/ML; MG/ML; MG/ML; MG/ML
SOLUTION OPHTHALMIC AS NEEDED
Status: DISCONTINUED | OUTPATIENT
Start: 2025-04-10 | End: 2025-04-10 | Stop reason: HOSPADM

## 2025-04-10 RX ORDER — MIDAZOLAM HYDROCHLORIDE 2 MG/2ML
INJECTION, SOLUTION INTRAMUSCULAR; INTRAVENOUS AS NEEDED
Status: DISCONTINUED | OUTPATIENT
Start: 2025-04-10 | End: 2025-04-10 | Stop reason: SURG

## 2025-04-10 RX ADMIN — TETRACAINE HYDROCHLORIDE 1 DROP: 5 SOLUTION OPHTHALMIC at 11:13

## 2025-04-10 RX ADMIN — Medication 1 DROP: at 11:14

## 2025-04-10 RX ADMIN — MIDAZOLAM HYDROCHLORIDE 2 MG: 1 INJECTION, SOLUTION INTRAMUSCULAR; INTRAVENOUS at 11:59

## 2025-04-10 RX ADMIN — Medication 1 DROP: at 11:19

## 2025-04-10 RX ADMIN — ACETAZOLAMIDE 500 MG: 500 CAPSULE ORAL at 12:21

## 2025-04-10 RX ADMIN — FENTANYL CITRATE 50 MCG: 50 INJECTION, SOLUTION INTRAMUSCULAR; INTRAVENOUS at 12:00

## 2025-04-10 RX ADMIN — Medication 1 DROP: at 11:24

## 2025-04-10 RX ADMIN — HALOPERIDOL LACTATE 0.5 MG: 5 INJECTION, SOLUTION INTRAMUSCULAR at 12:00

## 2025-04-10 RX ADMIN — TETRACAINE HYDROCHLORIDE 1 DROP: 5 SOLUTION OPHTHALMIC at 11:12

## 2025-04-10 NOTE — H&P
[facility]         History and Physical    Patient Name: Annalise Corona  MRN: [id number]  : 1950  Gender: female     HPI: Patient complaint of PPLOV Left eye diagnosed with cataract. Patient requests PHACO PCIOL for Increase of VA/ADL.    History:    Past Medical History:   Diagnosis Date    Arthritis     Body piercing     Both ears    Borderline diabetes     Colon polyp 10/16/2019    Coronary artery disease 2020    Dr. Osman     Elevated cholesterol     History of exercise stress test     Hyperlipidemia     Hypertension     PVD (peripheral vascular disease)     12 years ago     Stroke     TIA - pt states greater than 10 years ago    Wears glasses        Past Surgical History:   Procedure Laterality Date    BELOW KNEE LEG AMPUTATION Left     BREAST BIOPSY Left         CARDIOVASCULAR STRESS TEST      CATARACT EXTRACTION W/ INTRAOCULAR LENS IMPLANT Right 3/27/2025    Procedure: CATARACT PHACO EXTRACTION WITH INTRAOCULAR LENS IMPLANT RIGHT;  Surgeon: Terry Padilla MD;  Location: [facility] OR;  Service: Ophthalmology;  Laterality: Right;    COLONOSCOPY N/A 10/16/2019    Procedure: COLONOSCOPY WITH ARGON THERMAL ABLATION, COLD BIOPSY POLYPECTOMIES, AND BIOPSIES;  Surgeon: Kirit Gilmore MD;  Location: [facility] ENDOSCOPY;  Service: Gastroenterology    COLONOSCOPY N/A 02/15/2023    Procedure: COLONOSCOPY with biopsy and polypectomy ;  Surgeon: Ame Daugherty MD;  Location: [facility] ENDOSCOPY;  Service: Gastroenterology;  Laterality: N/A;    COLONOSCOPY N/A 2023    Procedure: COLONOSCOPY WITH AVM COAGULATION OF ASCENDING COLON AND HEPATIC FLEXURE AND RESOLUTION CLIP PLACEMENT X1;  Surgeon: Ame Daugherty MD;  Location: [facility] ENDOSCOPY;  Service: Gastroenterology;  Laterality: N/A;    CORONARY ANGIOPLASTY WITH STENT PLACEMENT      , times two stents    ENDOSCOPY N/A 2020    Procedure: ESOPHAGOGASTRODUODENOSCOPY;  Surgeon: Kirit Gilmore  MD MAURIZIO;  Location: [facility] ENDOSCOPY;  Service: Gastroenterology    ENDOSCOPY N/A 02/15/2023    Procedure: ESOPHAGOGASTRODUODENOSCOPY WITH BIOPSY and argon beam coagulation;  Surgeon: Ame Daugherty MD;  Location: [facility] ENDOSCOPY;  Service: Gastroenterology;  Laterality: N/A;    HYSTERECTOMY      INTERVENTIONAL RADIOLOGY PROCEDURE N/A 2019    Procedure: Abdominal Aortagram with Runoff;  Surgeon: Charan Osman MD;  Location: [facility] CATH INVASIVE LOCATION;  Service: Cardiovascular    OOPHORECTOMY         Social History     Socioeconomic History    Marital status:    Tobacco Use    Smoking status: Former     Current packs/day: 0.00     Types: Cigarettes     Quit date: 1/3/2013     Years since quittin.2    Smokeless tobacco: Never   Vaping Use    Vaping status: Never Used   Substance and Sexual Activity    Alcohol use: Yes     Alcohol/week: 6.0 standard drinks of alcohol     Types: 6 Cans of beer per week    Drug use: No    Sexual activity: Defer       Family History   Problem Relation Age of Onset    Hypertension Mother     Colon cancer Father     Lung cancer Father     Breast cancer Neg Hx        Prior to Admission Medications:  Medications Prior to Admission   Medication Sig Dispense Refill Last Dose/Taking    amLODIPine (NORVASC) 10 MG tablet Take 1 tablet by mouth Every Morning.   4/10/2025    aspirin 81 MG EC tablet Take 1 tablet by mouth Daily.   2025    calcium carbonate (OS-PASCALE) 600 MG tablet Take 1 tablet by mouth 2 (Two) Times a Day With Meals.   2025    clopidogrel (PLAVIX) 75 MG tablet Take 1 tablet by mouth Daily.   2025    ezetimibe (ZETIA) 10 MG tablet Take 1 tablet by mouth Daily.   2025    ferrous sulfate 324 (65 Fe) MG tablet delayed-release EC tablet Take 1 tablet by mouth Daily With Breakfast.   2025    gabapentin (NEURONTIN) 400 MG capsule Take 1 capsule by mouth 3 (Three) Times a Day.   2025    losartan (COZAAR) 25 MG tablet Take 1 tablet by  mouth 2 (Two) Times a Day.   4/9/2025    Multiple Vitamin (MULTIVITAMINS PO) Take 1 tablet by mouth Daily.   4/9/2025    oxybutynin XL (DITROPAN-XL) 5 MG 24 hr tablet Take 1 tablet by mouth Daily.   4/9/2025    pantoprazole (PROTONIX) 40 MG EC tablet Take 1 tablet by mouth Daily. (Patient not taking: Reported on 3/24/2025)   Unknown    prednisoLONE acetate (Pred Forte) 1 % ophthalmic suspension Administer 1 drop to the right eye 4 (Four) Times a Day.   4/9/2025    traZODone (DESYREL) 100 MG tablet Take 1 tablet by mouth At Night As Needed.   4/9/2025       Allergies:  No Known Allergies     Vitals: Temp:  [98.6 °F (37 °C)] 98.6 °F (37 °C)  Heart Rate:  [62] 62  Resp:  [16] 16  BP: (150)/(72) 150/72    Review of Systems:   Within Normal Limits Abnormal   HEENT [x]    []     Cardiovascular [x]   []     Gastrointestinal [x]   []     Genitourinary [x]   []     Neurologic [x]   []     Pulmonary [x]   []       Physical Exam:   Within Normal Limits Abnormal   HEENT [x]    []     Heart [x]   []     Lungs [x]   []     Abdomen [x]   []     Extremities [x]   []       Impression: Left nuclear sclerotic cataract.     Plan: CATARACT PHACO EXTRACTION WITH INTRAOCULAR LENS IMPLANT LEFT (Left)     Terry Padilla MD  4/10/2025

## 2025-04-10 NOTE — DISCHARGE INSTRUCTIONS
[facility], [facility] [place]  [address] [place] [address]  (P): [phone]           (F): [phone]    Annalise Corona  PATIENT NAME:__________________________________    Left Eye    POST OPERATIVE INSTRUCTIONS    You have received anesthesia today. DO NOT drive, drink alcohol, sign legal documents.   After surgery, your eye will not hurt. It may feel scratchy, sticky or uncomfortable. Your eye will be sensitive to light.  Most people see better 1-3 days after the procedure, but it could take 3 weeks to get the full benefits and reach your visual potential. If your vision is blurry for a few days it is normal, and means you may have swelling outside or inside the eye. For some patients, a bubble is placed and there will be blurriness.   You should receive a post-op kit with tape and an eye shield. Wear the shield for the first 3 nights after surgery to keep you from rubbing the eye.  Most people are able to return to their normal routine 1-3 days after surgery, however, due to the brain adjusting to your new vision you may have trouble judging distances and want to be careful when driving and going up and downstairs.   You can shower and wash your hair the day after surgery. Keep water, shampoo, hair spray and shaving lotion out of the eye, especially for the first week.  During the first week, you should AVOID:   Rubbing or putting pressure on your eye.  Eye make-up, face cream or lotion, hair coloring or perms  Strenuous activities, such as running or lifting weights, as to avoid sweat from getting in the eye. Avoid swimming, hot tubs, fumes or nathaniel conditions.   Keep your head above your heart (no hanging the head down for periods of time).  Some discomfort and blurred vision after surgery are normal, but if you have any unusual pain, swelling, bleeding or sudden decrease in vision, contact our office immediately. Emergency assistance is available at any time by calling:    Dr. Terry Antonio Dr.  Charanjit ALANISKasia LeahyPadilla    [phone]    If unable to reach call [facility] @ [phone]      POST OPERATIVE DROP INSTRUCTIONS  You have been prescribed eye drops to use after surgery, please follow these instructions:  PLACE A CRESENCIO IN THE DAY COLUMN EACH TIME YOU USE TO KEEP ON SCHEDULE. WAIT 5 MINUTES IN BETWEEN DROPS            Prednisolone (PINK TOP) SHAKE WELL BEFORE USE   GIVEN TO YOU BY THE HOSPITAL    WEEK 1-USE 4  (FOUR) TIMES A DAY DAY 1   DAY 2 DAY 3 DAY 4 DAY 5 DAY 6 DAY 7     WEEK 2-USE 3 (THREE)  TIMES A DAY DAY 1 DAY 2 DAY 3 DAY 4 DAY 5 DAY 6 DAY 7     WEEK 3-USE 2 (TWO) TIMES A DAY DAY 1 DAY 2 DAY 3 DAY 4 DAY 5 DAY 6 DAY 7     WEEK 4-USE 1 (ONE)TIME A DAY DAY 1 DAY 2 DAY 3 DAY 4 DAY 5 DAY 6 DAY 7         Ketorolac (GRAY TOP) PRESCRIBED TO YOUR PHARMACY    WEEK 1-USE 4  (FOUR) TIMES A DAY DAY 1   DAY 2 DAY 3 DAY 4 DAY 5 DAY 6 DAY 7     WEEK 2-USE 3 (THREE)  TIMES A DAY DAY 1 DAY 2 DAY 3 DAY 4 DAY 5 DAY 6 DAY 7     WEEK 3-USE 2 (TWO) TIMES A DAY DAY 1 DAY 2 DAY 3 DAY 4 DAY 5 DAY 6 DAY 7     WEEK 4-USE 1 (ONE)TIME A DAY DAY 1 DAY 2 DAY 3 DAY 4 DAY 5 DAY 6 DAY 7       Moxifloxacin (TAN TOP) PRESCRIBED TO YOUR PHARMACY    WEEK 1-USE 4  (FOUR) TIMES A DAY DAY 1   DAY 2 DAY 3 DAY 4 DAY 5 DAY 6 DAY 7     Rest today  No pushing,pulling,tugging,heavy lifting, or strenuous activity   No major decision making,driving,or drinking alcoholic beverages for 24 hours due to the sedation you received  Always use good hand hygiene/washing technique  No driving on pain medication.  To assist you in voiding:  Drink plenty of fluids  Listen to running water while attempting to void.    If you are unable to urinate and you have an uncomfortable urge to void or it has been   6 hours since you were discharged, return to the Emergency Room

## 2025-04-10 NOTE — OP NOTE
OPERATIVE NOTE    Date of Procedure: 4/10/2025  Patient Name: Annalise Corona  Patient MRN: [id number]  YOB: 1950     Preoperative Diagnosis: Left nuclear sclerotic cataract.     Postoperative Diagnosis: Left nuclear sclerotic cataract.     Procedure Performed: Phacoemulsification with implantation of a foldable posterior chamber intraocular lens, Left eye.     Surgeon: Terry Padilla MD    Anesthesia:  Monitored Anesthesia Care (MAC)      Brief History and Indication: The patient presents with a history of past progressive loss of vision.  Patient was diagnosed with cataract and requests removal for increased ability to read and see.     Operation Description: The patient was taken to the OR and prepped and draped in the usual sterile ophthalmic fashion. A lid speculum was placed in the eye.  A #75 blade was then used to make a stab incision two o’clock hours from the intended temporal clear cornea groove. The anterior chamber was then inflated with a Viscoelastic. A metal microkeratome blade was then used to enter the anterior chamber at the temporal clear cornea site. A three level tunnel incision was made. A curvilinear capsulorrhexis was then performed with a bent cystotome needle and capsulorrhexis forceps.  BSS on a 30 gauge bent cannula was used to hydro-dissect, and hydro-delineate the lens. Good fluid waves and hydro-delineation were noted. Phacoemulsification was then used to remove nuclear material without complications. The residual cortical and lenticular material was then removed with irrigation and aspiration. Viscoelastics were then used to inflate the bag in a soft shell technique. A PCIOL was injected into the bag. Post-implantation, there were no rents or tears in the bag and the lens was noted to be stable and centered. The residual Viscoelastic was then removed with irrigation and aspiration.  The wound was checked and found to be without leaks. Therefore a Polydex ointment  and one drop of Durezol eye drop was placed in the eye.     Implant Information:   Implant Name Type Inv. Item Serial No.  Lot No. LRB No. Used Action   LENS MONOFOCAL IQ [id number] - [id number] - [id number] Implant LENS MONOFOCAL IQ [id number]  Left 1 Implanted       Complications: None    Estimated Blood Loss:  Less than 1 cc.       []   Changed to complex procedure due to: []  hypermature, white cataract. Blue dye was used. []   small iris. A Malyugin Ring was used.    Discharge and Condition  The patient was transported to same day surgery in excellent condition and scheduled for follow-up tomorrow morning. The patient was given instructions on use of eye drops for the operative eye and was specifically instructed to call Dr. Padilla at his office or home for any nausea, vomiting, headache, decreased visual acuity, or pain, or if the patient had any general concerns.    Terry Padilla MD  4/10/2025

## 2025-04-10 NOTE — ANESTHESIA POSTPROCEDURE EVALUATION
Patient: Annalise Corona    Procedure Summary       Date: 04/10/25 Room / Location: [facility] OR 3 /  [facility]    Anesthesia Start: 1159 Anesthesia Stop:     Procedure: CATARACT PHACO EXTRACTION WITH INTRAOCULAR LENS IMPLANT LEFT (Left: Eye) Diagnosis:       Nuclear sclerotic cataract of left eye      (Nuclear sclerotic cataract of left eye [H25.12])    Surgeons: Terry Padilla MD Provider: Charan Diaz CRNA    Anesthesia Type: MAC ASA Status: 3            Anesthesia Type: MAC    Vitals  No vitals data found for the desired time range.          Post Anesthesia Care and Evaluation    Patient location during evaluation: PHASE II  Patient participation: complete - patient participated  Level of consciousness: awake  Pain score: 0  Pain management: adequate    Airway patency: patent  Anesthetic complications: No anesthetic complications  PONV Status: none  Cardiovascular status: acceptable  Respiratory status: acceptable, nasal cannula and spontaneous ventilation  Hydration status: acceptable    Comments: vsss resp spont, reflexes intact, responsive, report given to pacu nurseSee R.N. note for postop vital signs.

## 2025-06-30 ENCOUNTER — HOSPITAL ENCOUNTER (OUTPATIENT)
Dept: MAMMOGRAPHY | Facility: HOSPITAL | Age: 75
Discharge: HOME | End: 2025-06-30
Admitting: FAMILY MEDICINE
Payer: MEDICARE

## 2025-06-30 DIAGNOSIS — Z12.31 SCREENING MAMMOGRAM FOR BREAST CANCER: ICD-10-CM

## 2025-06-30 PROCEDURE — 77063 BREAST TOMOSYNTHESIS BI: CPT

## 2025-06-30 PROCEDURE — 77067 SCR MAMMO BI INCL CAD: CPT

## (undated) DEVICE — HYBRID TUBING/CAP SET FOR OLYMPUS® SCOPES: Brand: ERBE

## (undated) DEVICE — CONMED SCOPE SAVER BITE BLOCK, 20X27 MM: Brand: SCOPE SAVER

## (undated) DEVICE — FIAPC® PROBE W/ FILTER 2200 SC OD 2.3MM/6.9FR; L 2.2M/7.2FT: Brand: ERBE

## (undated) DEVICE — BLD BEAVER MICROSHARP 15D 3MM BLU LF

## (undated) DEVICE — ENDOSCOPY PORT CONNECTOR FOR OLYMPUS® SCOPES: Brand: ERBE

## (undated) DEVICE — HP CONCL INTREPID COAX I/A CRV .3MM

## (undated) DEVICE — PK CATH CARD 10

## (undated) DEVICE — FRCP BIOP COLD ENDOJAW ALLGTR W/NDL 2.8X2300MM BLU

## (undated) DEVICE — THE MONARCH® "D" CARTRIDGE IS A SINGLE-USE POLYPROPYLENE CARTRIDGE FOR POSTERIOR CHAMBER IOL DELIVERY: Brand: MONARCH® III

## (undated) DEVICE — Device

## (undated) DEVICE — CANN IRR/INJ AIR ANT CHAMBER 6MM BEND 27G

## (undated) DEVICE — MODEL AT P65, P/N 701554-001KIT CONTENTS: HAND CONTROLLER, 3-WAY HIGH-PRESSURE STOPCOCK WITH ROTATING END AND PREMIUM HIGH-PRESSURE TUBING: Brand: ANGIOTOUCH® KIT

## (undated) DEVICE — CLEARCUT® HP2 SLIT KNIFE INTREPID MICRO-COAXIAL SYSTEM 2.4 DB: Brand: CLEARCUT®; INTREPID

## (undated) DEVICE — SYR LUERLOK 5CC

## (undated) DEVICE — VLV SXN AIR/H2O ORCAPOD3 1P/U STRL

## (undated) DEVICE — AVANTI + 4F STD W/GW: Brand: AVANTI

## (undated) DEVICE — SUCTION CANISTER, 1500CC, RIGID: Brand: DEROYAL

## (undated) DEVICE — SOL IRRIG H2O 1000ML STRL

## (undated) DEVICE — PAD GRND REM POLYHESIVE A/ DISP

## (undated) DEVICE — LUBE JELLY PK/2.75GM STRL BX/144

## (undated) DEVICE — FIAPC® PROBE W/ FILTER 2200 C OD 2.3MM/6.9FR; L 2.2M/7.2FT: Brand: ERBE

## (undated) DEVICE — SINGLE-USE POLYPECTOMY SNARE: Brand: CAPTIVATOR II

## (undated) DEVICE — Device: Brand: DEFENDO AIR/WATER/SUCTION AND BIOPSY VALVE

## (undated) DEVICE — GLV SURG SENSICARE W/ALOE PF LF 8 STRL

## (undated) DEVICE — CATH MPAC INF F4 JR4/JL4/PIG: Brand: INFINITI

## (undated) DEVICE — FRCP BX RADJAW4 NDL 2.8 240 STD OG

## (undated) DEVICE — QUICK CATCH IN-LINE SUCTION POLYP TRAP IS USED FOR SUCTION RETRIEVAL OF ENDOSCOPICALLY REMOVED POLYPS.

## (undated) DEVICE — MODEL BT2000 P/N 700287-012KIT CONTENTS: MANIFOLD WITH SALINE AND CONTRAST PORTS, SALINE TUBING WITH SPIKE AND HAND SYRINGE, TRANSDUCER: Brand: BT2000 AUTOMATED MANIFOLD KIT

## (undated) DEVICE — GW J TP FIX CORE .035 150